# Patient Record
Sex: FEMALE | Race: WHITE | NOT HISPANIC OR LATINO | Employment: OTHER | URBAN - METROPOLITAN AREA
[De-identification: names, ages, dates, MRNs, and addresses within clinical notes are randomized per-mention and may not be internally consistent; named-entity substitution may affect disease eponyms.]

---

## 2018-07-27 ENCOUNTER — TRANSFERRED RECORDS (OUTPATIENT)
Dept: HEALTH INFORMATION MANAGEMENT | Facility: CLINIC | Age: 78
End: 2018-07-27

## 2018-07-27 ENCOUNTER — MEDICAL CORRESPONDENCE (OUTPATIENT)
Dept: CARDIOLOGY | Facility: CLINIC | Age: 78
End: 2018-07-27
Payer: MEDICARE

## 2018-07-27 PROCEDURE — 99205 OFFICE O/P NEW HI 60 MIN: CPT | Mod: ZP | Performed by: INTERNAL MEDICINE

## 2018-08-02 ENCOUNTER — TELEPHONE (OUTPATIENT)
Dept: CARDIOLOGY | Facility: CLINIC | Age: 78
End: 2018-08-02

## 2018-08-02 DIAGNOSIS — I27.20 PULMONARY HYPERTENSION (H): ICD-10-CM

## 2018-08-02 DIAGNOSIS — I50.9 HEART FAILURE (H): ICD-10-CM

## 2018-08-02 DIAGNOSIS — Z11.59 ENCOUNTER FOR SCREENING FOR OTHER VIRAL DISEASES (CODE): ICD-10-CM

## 2018-08-02 DIAGNOSIS — R06.09 DYSPNEA ON EXERTION: ICD-10-CM

## 2018-08-02 DIAGNOSIS — Z53.9 ERRONEOUS ENCOUNTER--DISREGARD: Primary | ICD-10-CM

## 2018-08-02 RX ORDER — LIDOCAINE 40 MG/G
CREAM TOPICAL
Status: CANCELLED | OUTPATIENT
Start: 2018-08-02 | End: 2019-08-02

## 2018-08-02 NOTE — PROGRESS NOTES
Date: 2018    Time of Call: 1:20 PM     Diagnosis:  PH     [ TORB ] Ordering provider: Sergio Hill MD   Order:   Team:    Patty Rivera  : 1940  Phone: [phone]    She needs the full deal:    1. PH blood work  2. HRCT  3. V/Q scan  4. RHC with vasodilator testing  5. Six minute walk test    She is scheduled to see me back in [place] in September. If you need more info on her, you can review my note in [facility].     Thank you.     Sincerely,  Sergio     Order received by: Amira Gonzalez RN     Follow-up/additional notes: Called pt and left voicemail to review testing information.

## 2018-08-09 ENCOUNTER — TELEPHONE (OUTPATIENT)
Dept: CARDIOLOGY | Facility: CLINIC | Age: 78
End: 2018-08-09

## 2018-08-09 DIAGNOSIS — I27.0 PRIMARY PULMONARY HYPERTENSION (H): Primary | ICD-10-CM

## 2018-08-09 PROBLEM — J98.4 RESTRICTIVE LUNG DISEASE: Status: ACTIVE | Noted: 2018-06-06

## 2018-08-09 RX ORDER — LEVOTHYROXINE SODIUM 25 UG/1
50 CAPSULE ORAL DAILY
COMMUNITY
Start: 2012-07-05 | End: 2018-08-09

## 2018-08-09 RX ORDER — FUROSEMIDE 20 MG
20 TABLET ORAL DAILY
COMMUNITY
Start: 2012-07-05 | End: 2020-11-05

## 2018-08-09 RX ORDER — TRAZODONE HYDROCHLORIDE 50 MG/1
50 TABLET, FILM COATED ORAL
Status: ON HOLD | COMMUNITY
End: 2020-08-19

## 2018-08-09 RX ORDER — ATORVASTATIN CALCIUM 10 MG/1
20 TABLET, FILM COATED ORAL DAILY
COMMUNITY
Start: 2012-07-05

## 2018-08-09 RX ORDER — AMLODIPINE BESYLATE 10 MG/1
10 TABLET ORAL DAILY
COMMUNITY
Start: 2018-06-28

## 2018-08-09 RX ORDER — LEVOTHYROXINE SODIUM 25 UG/1
88 CAPSULE ORAL DAILY
Qty: 30 CAPSULE | COMMUNITY
Start: 2018-08-09

## 2018-08-09 RX ORDER — LOSARTAN POTASSIUM 100 MG/1
50 TABLET ORAL DAILY
COMMUNITY
Start: 2012-07-05 | End: 2021-02-08

## 2018-08-09 RX ORDER — GLIMEPIRIDE 1 MG/1
1 TABLET ORAL DAILY
Status: ON HOLD | COMMUNITY
Start: 2013-08-07 | End: 2020-08-19

## 2018-08-09 NOTE — TELEPHONE ENCOUNTER
Patient was transferred to me after having scheduled the following tests;      [facility]    6mwt    VQ scan    Chest CT    Reviewed what each test was for, and any pre-procedure instructions.  Discussed holding her diuretic and diabetic med AM of [facility].  Reviewed and updated allergies. Patient was provided my direct dial number for any future questions.  Patient verbalized understanding, agreed with plan and denied any further questions. Lashawn Yu RN on 8/9/2018 at 11:26 AM

## 2018-08-22 ENCOUNTER — RADIANT APPOINTMENT (OUTPATIENT)
Dept: CT IMAGING | Facility: CLINIC | Age: 78
End: 2018-08-22
Attending: INTERNAL MEDICINE
Payer: MEDICARE

## 2018-08-22 DIAGNOSIS — Z11.59 ENCOUNTER FOR SCREENING FOR OTHER VIRAL DISEASES (CODE): ICD-10-CM

## 2018-08-22 DIAGNOSIS — I50.9 HEART FAILURE (H): ICD-10-CM

## 2018-08-22 DIAGNOSIS — I27.20 PULMONARY HYPERTENSION (H): ICD-10-CM

## 2018-08-22 DIAGNOSIS — R06.09 DYSPNEA ON EXERTION: ICD-10-CM

## 2018-08-22 LAB
6 MIN WALK (FT): 615 FT
6 MIN WALK (M): 187 M
FIO2-PRE: 36 %

## 2018-08-23 ENCOUNTER — APPOINTMENT (OUTPATIENT)
Dept: CARDIOLOGY | Facility: CLINIC | Age: 78
End: 2018-08-23
Attending: INTERNAL MEDICINE
Payer: MEDICARE

## 2018-08-23 ENCOUNTER — APPOINTMENT (OUTPATIENT)
Dept: LAB | Facility: CLINIC | Age: 78
End: 2018-08-23
Attending: INTERNAL MEDICINE
Payer: MEDICARE

## 2018-08-23 ENCOUNTER — HOSPITAL ENCOUNTER (OUTPATIENT)
Dept: NUCLEAR MEDICINE | Facility: CLINIC | Age: 78
Setting detail: NUCLEAR MEDICINE
End: 2018-08-23
Attending: INTERNAL MEDICINE
Payer: MEDICARE

## 2018-08-23 ENCOUNTER — APPOINTMENT (OUTPATIENT)
Dept: MEDSURG UNIT | Facility: CLINIC | Age: 78
End: 2018-08-23
Attending: INTERNAL MEDICINE
Payer: MEDICARE

## 2018-08-23 ENCOUNTER — HOSPITAL ENCOUNTER (OUTPATIENT)
Facility: CLINIC | Age: 78
Discharge: HOME | End: 2018-08-23
Attending: INTERNAL MEDICINE | Admitting: INTERNAL MEDICINE
Payer: MEDICARE

## 2018-08-23 VITALS
SYSTOLIC BLOOD PRESSURE: 153 MMHG | HEART RATE: 78 BPM | OXYGEN SATURATION: 98 % | WEIGHT: 217 LBS | HEIGHT: 61 IN | DIASTOLIC BLOOD PRESSURE: 79 MMHG | BODY MASS INDEX: 40.97 KG/M2 | TEMPERATURE: 97.6 F | RESPIRATION RATE: 22 BRPM

## 2018-08-23 DIAGNOSIS — I27.20 PULMONARY HYPERTENSION (H): ICD-10-CM

## 2018-08-23 DIAGNOSIS — R06.09 DYSPNEA ON EXERTION: ICD-10-CM

## 2018-08-23 DIAGNOSIS — Z11.59 ENCOUNTER FOR SCREENING FOR OTHER VIRAL DISEASES (CODE): ICD-10-CM

## 2018-08-23 DIAGNOSIS — I50.9 HEART FAILURE (H): ICD-10-CM

## 2018-08-23 LAB
ALBUMIN SERPL-MCNC: 4.3 G/DL (ref 3.4–5)
ALP SERPL-CCNC: 99 U/L (ref 40–150)
ALT SERPL W P-5'-P-CCNC: 23 U/L (ref 0–50)
ANION GAP SERPL CALCULATED.3IONS-SCNC: 7 MMOL/L (ref 3–14)
AST SERPL W P-5'-P-CCNC: 25 U/L (ref 0–45)
BILIRUB DIRECT SERPL-MCNC: 0.2 MG/DL (ref 0–0.2)
BILIRUB SERPL-MCNC: 1 MG/DL (ref 0.2–1.3)
BUN SERPL-MCNC: 31 MG/DL (ref 7–30)
CALCIUM SERPL-MCNC: 9.3 MG/DL (ref 8.5–10.1)
CHLORIDE SERPL-SCNC: 106 MMOL/L (ref 94–109)
CHOLEST SERPL-MCNC: 163 MG/DL
CO2 SERPL-SCNC: 27 MMOL/L (ref 20–32)
CREAT SERPL-MCNC: 1.34 MG/DL (ref 0.52–1.04)
CRP SERPL-MCNC: 3.4 MG/L (ref 0–8)
ERYTHROCYTE [DISTWIDTH] IN BLOOD BY AUTOMATED COUNT: 13.3 % (ref 10–15)
GFR SERPL CREATININE-BSD FRML MDRD: 38 ML/MIN/1.7M2
GLUCOSE SERPL-MCNC: 132 MG/DL (ref 70–99)
HBV CORE AB SERPL QL IA: NONREACTIVE
HBV SURFACE AB SERPL IA-ACNC: 0.17 M[IU]/ML
HBV SURFACE AG SERPL QL IA: NONREACTIVE
HCT VFR BLD AUTO: 40.3 % (ref 35–47)
HCV AB SERPL QL IA: NONREACTIVE
HDLC SERPL-MCNC: 69 MG/DL
HGB BLD-MCNC: 13.5 G/DL (ref 11.7–15.7)
HIV 1+2 AB+HIV1 P24 AG SERPL QL IA: NONREACTIVE
INR PPP: 1.15 (ref 0.86–1.14)
IRON SATN MFR SERPL: 28 % (ref 15–46)
IRON SERPL-MCNC: 95 UG/DL (ref 35–180)
LDLC SERPL CALC-MCNC: 63 MG/DL
MCH RBC QN AUTO: 31.2 PG (ref 26.5–33)
MCHC RBC AUTO-ENTMCNC: 33.5 G/DL (ref 31.5–36.5)
MCV RBC AUTO: 93 FL (ref 78–100)
NONHDLC SERPL-MCNC: 94 MG/DL
NT-PROBNP SERPL-MCNC: 356 PG/ML (ref 0–450)
PLATELET # BLD AUTO: 337 10E9/L (ref 150–450)
POTASSIUM SERPL-SCNC: 4.5 MMOL/L (ref 3.4–5.3)
PROT SERPL-MCNC: 8 G/DL (ref 6.8–8.8)
RBC # BLD AUTO: 4.33 10E12/L (ref 3.8–5.2)
RHEUMATOID FACT SER NEPH-ACNC: <20 IU/ML (ref 0–20)
SODIUM SERPL-SCNC: 140 MMOL/L (ref 133–144)
TIBC SERPL-MCNC: 338 UG/DL (ref 240–430)
TRIGL SERPL-MCNC: 153 MG/DL
TSH SERPL DL<=0.005 MIU/L-ACNC: 3.63 MU/L (ref 0.4–4)
WBC # BLD AUTO: 6.7 10E9/L (ref 4–11)

## 2018-08-23 PROCEDURE — 85027 COMPLETE CBC AUTOMATED: CPT | Performed by: INTERNAL MEDICINE

## 2018-08-23 PROCEDURE — 36415 COLL VENOUS BLD VENIPUNCTURE: CPT | Performed by: INTERNAL MEDICINE

## 2018-08-23 PROCEDURE — 85610 PROTHROMBIN TIME: CPT | Performed by: INTERNAL MEDICINE

## 2018-08-23 PROCEDURE — 93451 RIGHT HEART CATH: CPT | Mod: 26 | Performed by: INTERNAL MEDICINE

## 2018-08-23 PROCEDURE — 40000166 ZZH STATISTIC PP CARE STAGE 1

## 2018-08-23 PROCEDURE — 84238 ASSAY NONENDOCRINE RECEPTOR: CPT | Performed by: INTERNAL MEDICINE

## 2018-08-23 PROCEDURE — 80048 BASIC METABOLIC PNL TOTAL CA: CPT | Performed by: INTERNAL MEDICINE

## 2018-08-23 PROCEDURE — 00000401 ZZHCL STATISTIC THROMBIN TIME NC: Performed by: INTERNAL MEDICINE

## 2018-08-23 PROCEDURE — 86140 C-REACTIVE PROTEIN: CPT | Performed by: INTERNAL MEDICINE

## 2018-08-23 PROCEDURE — 85613 RUSSELL VIPER VENOM DILUTED: CPT | Performed by: INTERNAL MEDICINE

## 2018-08-23 PROCEDURE — A9567 TECHNETIUM TC-99M AEROSOL: HCPCS | Performed by: INTERNAL MEDICINE

## 2018-08-23 PROCEDURE — 93463 DRUG ADMIN & HEMODYNMIC MEAS: CPT | Performed by: INTERNAL MEDICINE

## 2018-08-23 PROCEDURE — 86706 HEP B SURFACE ANTIBODY: CPT | Performed by: INTERNAL MEDICINE

## 2018-08-23 PROCEDURE — 86431 RHEUMATOID FACTOR QUANT: CPT | Performed by: INTERNAL MEDICINE

## 2018-08-23 PROCEDURE — G0499 HEPB SCREEN HIGH RISK INDIV: HCPCS | Performed by: INTERNAL MEDICINE

## 2018-08-23 PROCEDURE — 84443 ASSAY THYROID STIM HORMONE: CPT | Performed by: INTERNAL MEDICINE

## 2018-08-23 PROCEDURE — 86038 ANTINUCLEAR ANTIBODIES: CPT | Performed by: INTERNAL MEDICINE

## 2018-08-23 PROCEDURE — 86803 HEPATITIS C AB TEST: CPT | Performed by: INTERNAL MEDICINE

## 2018-08-23 PROCEDURE — 27211181 ZZH BALLOON TIP PRESSURE CR5

## 2018-08-23 PROCEDURE — 83880 ASSAY OF NATRIURETIC PEPTIDE: CPT | Performed by: INTERNAL MEDICINE

## 2018-08-23 PROCEDURE — 83540 ASSAY OF IRON: CPT | Performed by: INTERNAL MEDICINE

## 2018-08-23 PROCEDURE — 34300033 ZZH RX 343: Performed by: INTERNAL MEDICINE

## 2018-08-23 PROCEDURE — 85730 THROMBOPLASTIN TIME PARTIAL: CPT | Performed by: INTERNAL MEDICINE

## 2018-08-23 PROCEDURE — 27210982 ZZH KIT RT HC TOTES DISP CR7

## 2018-08-23 PROCEDURE — 27210210 NM LUNG SCAN VENTILATION AND PERFUSION

## 2018-08-23 PROCEDURE — 87389 HIV-1 AG W/HIV-1&-2 AB AG IA: CPT | Performed by: INTERNAL MEDICINE

## 2018-08-23 PROCEDURE — 86704 HEP B CORE ANTIBODY TOTAL: CPT | Performed by: INTERNAL MEDICINE

## 2018-08-23 PROCEDURE — 80076 HEPATIC FUNCTION PANEL: CPT | Performed by: INTERNAL MEDICINE

## 2018-08-23 PROCEDURE — 83520 IMMUNOASSAY QUANT NOS NONAB: CPT | Performed by: INTERNAL MEDICINE

## 2018-08-23 PROCEDURE — 27210787 ZZH MANIFOLD CR2

## 2018-08-23 PROCEDURE — 80061 LIPID PANEL: CPT | Performed by: INTERNAL MEDICINE

## 2018-08-23 PROCEDURE — 93463 DRUG ADMIN & HEMODYNMIC MEAS: CPT

## 2018-08-23 PROCEDURE — A9540 TC99M MAA: HCPCS | Performed by: INTERNAL MEDICINE

## 2018-08-23 PROCEDURE — 25000125 ZZHC RX 250: Performed by: INTERNAL MEDICINE

## 2018-08-23 PROCEDURE — 83550 IRON BINDING TEST: CPT | Performed by: INTERNAL MEDICINE

## 2018-08-23 PROCEDURE — 93451 RIGHT HEART CATH: CPT

## 2018-08-23 RX ORDER — ARGATROBAN 1 MG/ML
150 INJECTION, SOLUTION INTRAVENOUS
Status: DISCONTINUED | OUTPATIENT
Start: 2018-08-23 | End: 2018-08-23 | Stop reason: HOSPADM

## 2018-08-23 RX ORDER — BETAMETHASONE DIPROPIONATE 0.05 %
OINTMENT (GRAM) TOPICAL 2 TIMES DAILY
COMMUNITY

## 2018-08-23 RX ORDER — DOBUTAMINE HYDROCHLORIDE 200 MG/100ML
2-20 INJECTION INTRAVENOUS CONTINUOUS PRN
Status: DISCONTINUED | OUTPATIENT
Start: 2018-08-23 | End: 2018-08-23 | Stop reason: HOSPADM

## 2018-08-23 RX ORDER — METOPROLOL TARTRATE 1 MG/ML
5 INJECTION, SOLUTION INTRAVENOUS EVERY 5 MIN PRN
Status: DISCONTINUED | OUTPATIENT
Start: 2018-08-23 | End: 2018-08-23 | Stop reason: HOSPADM

## 2018-08-23 RX ORDER — PHENYLEPHRINE HCL IN 0.9% NACL 1 MG/10 ML
20-100 SYRINGE (ML) INTRAVENOUS
Status: DISCONTINUED | OUTPATIENT
Start: 2018-08-23 | End: 2018-08-23 | Stop reason: HOSPADM

## 2018-08-23 RX ORDER — PRASUGREL 10 MG/1
10-60 TABLET, FILM COATED ORAL
Status: DISCONTINUED | OUTPATIENT
Start: 2018-08-23 | End: 2018-08-23 | Stop reason: HOSPADM

## 2018-08-23 RX ORDER — POTASSIUM CHLORIDE 7.45 MG/ML
10 INJECTION INTRAVENOUS
Status: DISCONTINUED | OUTPATIENT
Start: 2018-08-23 | End: 2018-08-23 | Stop reason: HOSPADM

## 2018-08-23 RX ORDER — FLUMAZENIL 0.1 MG/ML
0.2 INJECTION, SOLUTION INTRAVENOUS
Status: DISCONTINUED | OUTPATIENT
Start: 2018-08-23 | End: 2018-08-23 | Stop reason: HOSPADM

## 2018-08-23 RX ORDER — NITROGLYCERIN 20 MG/100ML
.07-2 INJECTION INTRAVENOUS CONTINUOUS PRN
Status: DISCONTINUED | OUTPATIENT
Start: 2018-08-23 | End: 2018-08-23 | Stop reason: HOSPADM

## 2018-08-23 RX ORDER — NALOXONE HYDROCHLORIDE 0.4 MG/ML
0.4 INJECTION, SOLUTION INTRAMUSCULAR; INTRAVENOUS; SUBCUTANEOUS EVERY 5 MIN PRN
Status: DISCONTINUED | OUTPATIENT
Start: 2018-08-23 | End: 2018-08-23 | Stop reason: HOSPADM

## 2018-08-23 RX ORDER — LIDOCAINE 40 MG/G
CREAM TOPICAL
Status: COMPLETED | OUTPATIENT
Start: 2018-08-23 | End: 2018-08-23

## 2018-08-23 RX ORDER — HEPARIN SODIUM 1000 [USP'U]/ML
1000-10000 INJECTION, SOLUTION INTRAVENOUS; SUBCUTANEOUS EVERY 5 MIN PRN
Status: DISCONTINUED | OUTPATIENT
Start: 2018-08-23 | End: 2018-08-23 | Stop reason: HOSPADM

## 2018-08-23 RX ORDER — EPINEPHRINE 1 MG/ML
0.3 INJECTION, SOLUTION, CONCENTRATE INTRAVENOUS
Status: DISCONTINUED | OUTPATIENT
Start: 2018-08-23 | End: 2018-08-23 | Stop reason: HOSPADM

## 2018-08-23 RX ORDER — ASPIRIN 325 MG
325 TABLET ORAL
Status: DISCONTINUED | OUTPATIENT
Start: 2018-08-23 | End: 2018-08-23 | Stop reason: HOSPADM

## 2018-08-23 RX ORDER — DEXTROSE MONOHYDRATE 25 G/50ML
12.5-5 INJECTION, SOLUTION INTRAVENOUS EVERY 30 MIN PRN
Status: DISCONTINUED | OUTPATIENT
Start: 2018-08-23 | End: 2018-08-23 | Stop reason: HOSPADM

## 2018-08-23 RX ORDER — LIDOCAINE HYDROCHLORIDE 10 MG/ML
30 INJECTION, SOLUTION EPIDURAL; INFILTRATION; INTRACAUDAL; PERINEURAL
Status: DISCONTINUED | OUTPATIENT
Start: 2018-08-23 | End: 2018-08-23 | Stop reason: HOSPADM

## 2018-08-23 RX ORDER — FUROSEMIDE 10 MG/ML
20-100 INJECTION INTRAMUSCULAR; INTRAVENOUS
Status: DISCONTINUED | OUTPATIENT
Start: 2018-08-23 | End: 2018-08-23 | Stop reason: HOSPADM

## 2018-08-23 RX ORDER — LORAZEPAM 2 MG/ML
.5-2 INJECTION INTRAMUSCULAR EVERY 4 HOURS PRN
Status: DISCONTINUED | OUTPATIENT
Start: 2018-08-23 | End: 2018-08-23 | Stop reason: HOSPADM

## 2018-08-23 RX ORDER — SODIUM NITROPRUSSIDE 25 MG/ML
100-200 INJECTION INTRAVENOUS
Status: DISCONTINUED | OUTPATIENT
Start: 2018-08-23 | End: 2018-08-23 | Stop reason: HOSPADM

## 2018-08-23 RX ORDER — DOPAMINE HYDROCHLORIDE 160 MG/100ML
2-20 INJECTION, SOLUTION INTRAVENOUS CONTINUOUS PRN
Status: DISCONTINUED | OUTPATIENT
Start: 2018-08-23 | End: 2018-08-23 | Stop reason: HOSPADM

## 2018-08-23 RX ORDER — VERAPAMIL HYDROCHLORIDE 2.5 MG/ML
1-5 INJECTION, SOLUTION INTRAVENOUS
Status: DISCONTINUED | OUTPATIENT
Start: 2018-08-23 | End: 2018-08-23 | Stop reason: HOSPADM

## 2018-08-23 RX ORDER — EPTIFIBATIDE 2 MG/ML
180 INJECTION, SOLUTION INTRAVENOUS EVERY 10 MIN PRN
Status: DISCONTINUED | OUTPATIENT
Start: 2018-08-23 | End: 2018-08-23 | Stop reason: HOSPADM

## 2018-08-23 RX ORDER — PROTAMINE SULFATE 10 MG/ML
25-100 INJECTION, SOLUTION INTRAVENOUS EVERY 5 MIN PRN
Status: DISCONTINUED | OUTPATIENT
Start: 2018-08-23 | End: 2018-08-23 | Stop reason: HOSPADM

## 2018-08-23 RX ORDER — MAGNESIUM HYDROXIDE 1200 MG/15ML
1000 LIQUID ORAL CONTINUOUS PRN
Status: DISCONTINUED | OUTPATIENT
Start: 2018-08-23 | End: 2018-08-23 | Stop reason: HOSPADM

## 2018-08-23 RX ORDER — ARGATROBAN 1 MG/ML
350 INJECTION, SOLUTION INTRAVENOUS
Status: DISCONTINUED | OUTPATIENT
Start: 2018-08-23 | End: 2018-08-23 | Stop reason: HOSPADM

## 2018-08-23 RX ORDER — EPTIFIBATIDE 2 MG/ML
2 INJECTION, SOLUTION INTRAVENOUS CONTINUOUS PRN
Status: DISCONTINUED | OUTPATIENT
Start: 2018-08-23 | End: 2018-08-23 | Stop reason: HOSPADM

## 2018-08-23 RX ORDER — NITROGLYCERIN 0.4 MG/1
0.4 TABLET SUBLINGUAL EVERY 5 MIN PRN
Status: DISCONTINUED | OUTPATIENT
Start: 2018-08-23 | End: 2018-08-23 | Stop reason: HOSPADM

## 2018-08-23 RX ORDER — ENALAPRILAT 1.25 MG/ML
1.25-2.5 INJECTION INTRAVENOUS
Status: DISCONTINUED | OUTPATIENT
Start: 2018-08-23 | End: 2018-08-23 | Stop reason: HOSPADM

## 2018-08-23 RX ORDER — NITROGLYCERIN 5 MG/ML
100-200 VIAL (ML) INTRAVENOUS
Status: DISCONTINUED | OUTPATIENT
Start: 2018-08-23 | End: 2018-08-23 | Stop reason: HOSPADM

## 2018-08-23 RX ORDER — ONDANSETRON 2 MG/ML
4 INJECTION INTRAMUSCULAR; INTRAVENOUS EVERY 4 HOURS PRN
Status: DISCONTINUED | OUTPATIENT
Start: 2018-08-23 | End: 2018-08-23 | Stop reason: HOSPADM

## 2018-08-23 RX ORDER — ASPIRIN 81 MG/1
81-324 TABLET, CHEWABLE ORAL
Status: DISCONTINUED | OUTPATIENT
Start: 2018-08-23 | End: 2018-08-23 | Stop reason: HOSPADM

## 2018-08-23 RX ORDER — METHYLPREDNISOLONE SODIUM SUCCINATE 125 MG/2ML
125 INJECTION, POWDER, LYOPHILIZED, FOR SOLUTION INTRAMUSCULAR; INTRAVENOUS
Status: DISCONTINUED | OUTPATIENT
Start: 2018-08-23 | End: 2018-08-23 | Stop reason: HOSPADM

## 2018-08-23 RX ORDER — ADENOSINE 3 MG/ML
12-12000 INJECTION, SOLUTION INTRAVENOUS
Status: DISCONTINUED | OUTPATIENT
Start: 2018-08-23 | End: 2018-08-23 | Stop reason: HOSPADM

## 2018-08-23 RX ORDER — NITROGLYCERIN 5 MG/ML
100-500 VIAL (ML) INTRAVENOUS
Status: DISCONTINUED | OUTPATIENT
Start: 2018-08-23 | End: 2018-08-23 | Stop reason: HOSPADM

## 2018-08-23 RX ORDER — DIPHENHYDRAMINE HYDROCHLORIDE 50 MG/ML
25-50 INJECTION INTRAMUSCULAR; INTRAVENOUS
Status: DISCONTINUED | OUTPATIENT
Start: 2018-08-23 | End: 2018-08-23 | Stop reason: HOSPADM

## 2018-08-23 RX ORDER — CLOPIDOGREL BISULFATE 75 MG/1
75 TABLET ORAL
Status: DISCONTINUED | OUTPATIENT
Start: 2018-08-23 | End: 2018-08-23 | Stop reason: HOSPADM

## 2018-08-23 RX ORDER — CLOPIDOGREL BISULFATE 75 MG/1
300-600 TABLET ORAL
Status: DISCONTINUED | OUTPATIENT
Start: 2018-08-23 | End: 2018-08-23 | Stop reason: HOSPADM

## 2018-08-23 RX ORDER — NIFEDIPINE 10 MG/1
10 CAPSULE ORAL
Status: DISCONTINUED | OUTPATIENT
Start: 2018-08-23 | End: 2018-08-23 | Stop reason: HOSPADM

## 2018-08-23 RX ORDER — HYDRALAZINE HYDROCHLORIDE 20 MG/ML
10-20 INJECTION INTRAMUSCULAR; INTRAVENOUS
Status: DISCONTINUED | OUTPATIENT
Start: 2018-08-23 | End: 2018-08-23 | Stop reason: HOSPADM

## 2018-08-23 RX ORDER — POTASSIUM CHLORIDE 29.8 MG/ML
20 INJECTION INTRAVENOUS
Status: DISCONTINUED | OUTPATIENT
Start: 2018-08-23 | End: 2018-08-23 | Stop reason: HOSPADM

## 2018-08-23 RX ORDER — ATROPINE SULFATE 0.1 MG/ML
.5-1 INJECTION INTRAVENOUS
Status: DISCONTINUED | OUTPATIENT
Start: 2018-08-23 | End: 2018-08-23 | Stop reason: HOSPADM

## 2018-08-23 RX ORDER — PROTAMINE SULFATE 10 MG/ML
1-5 INJECTION, SOLUTION INTRAVENOUS
Status: DISCONTINUED | OUTPATIENT
Start: 2018-08-23 | End: 2018-08-23 | Stop reason: HOSPADM

## 2018-08-23 RX ORDER — NICARDIPINE HYDROCHLORIDE 2.5 MG/ML
100 INJECTION INTRAVENOUS
Status: DISCONTINUED | OUTPATIENT
Start: 2018-08-23 | End: 2018-08-23 | Stop reason: HOSPADM

## 2018-08-23 RX ORDER — EPTIFIBATIDE 2 MG/ML
1 INJECTION, SOLUTION INTRAVENOUS CONTINUOUS PRN
Status: DISCONTINUED | OUTPATIENT
Start: 2018-08-23 | End: 2018-08-23 | Stop reason: HOSPADM

## 2018-08-23 RX ADMIN — LIDOCAINE: 40 CREAM TOPICAL at 10:03

## 2018-08-23 RX ADMIN — Medication 6 MCI.: at 08:44

## 2018-08-23 RX ADMIN — KIT FOR THE PREPARATION OF TECHNETIUM TC 99M PENTETATE 2 MCI.: 20 INJECTION, POWDER, LYOPHILIZED, FOR SOLUTION INTRAVENOUS; RESPIRATORY (INHALATION) at 08:03

## 2018-08-23 NOTE — IP AVS SNAPSHOT
[address] [address]                                       After Visit Summary   8/23/2018    Patty Rivera    MRN: [id number]           After Visit Summary Signature Page     I have received my discharge instructions, and my questions have been answered. I have discussed any challenges I see with this plan with the nurse or doctor.    ..........................................................................................................................................  Patient/Patient Representative Signature      ..........................................................................................................................................  Patient Representative Print Name and Relationship to Patient    ..................................................               ................................................  Date                                            Time    ..........................................................................................................................................  Reviewed by Signature/Title    ...................................................              ..............................................  Date                                                            Time

## 2018-08-23 NOTE — DISCHARGE INSTRUCTIONS
[facility]                        Interventional Cardiology  Discharge Instructions   Post Right Heart Cath      AFTER YOU GO HOME:    DO drink plenty of fluids    DO resume your regular diet and medications unless otherwise instructed by your Primary Physician    Do Not scrub the procedure site vigorously    No lotion or powder to the puncture site for 3 days    CALL YOUR PRIMARY PHYSICIAN IF: You may resume all normal activity.  Monitor neck site for bleeding, swelling, or voice changes. If you notice bleeding or swelling immediately apply pressure to the site and call number below to speak with Cardiology Fellow.  If you experience any changes in your breathing you should call your doctor immediately or come to the closest Emergency Department.  Do not drive yourself.    ADDITIONAL INSTRUCTIONS: Medications: You are to resume all home medications including anticoagulation therapy unless otherwise advised by your primary cardiologist or nurse coordinator.    Follow Up: Per your primary cardiology team    If you have any questions or concerns regarding your procedure site please call [phone] at anytime and ask for Cardiology Fellow on call.  They are available 24 hours a day.  You may also contact the Cardiology Clinic after hours number at [phone].                                                       Telephone Numbers [phone] Monday-Friday 8:00 am to 4:30 pm    [phone] After 4:30 pm Monday-Friday, Weekends & Holidays  Ask for Interventional Cardiologist on call. Someone is on call 24 hours/day   [facility] toll free number [phone] Monday-Friday 8:00 am to 4:30 pm   [facility] Emergency Dept [phone]

## 2018-08-23 NOTE — PROGRESS NOTES
Pt arrived the the Cardiac Catheterization Lab for a right heart cath.   7 fr sheath removed from the RIJ site. Manual pressure held until hemostasis has been obtained.  Soft, no hematoma.  No bleeding, oozing or discoloration seen.  Band Aid applied.  Pt educated to watch for any signs of bleeding, pain, or voice changes after discharge for the hospital.    Report called.

## 2018-08-23 NOTE — IP AVS SNAPSHOT
MRN:[id number]                      After Visit Summary   8/23/2018    Patty Rivera    MRN: [id number]           Visit Information        Department      8/23/2018  8:53 AM [address] [facility]          Review of your medicines      UNREVIEWED medicines. Ask your doctor about these medicines        Dose / Directions    amLODIPine 10 MG tablet   Commonly known as:  NORVASC        Dose:  10 mg   Take 10 mg by mouth daily   Refills:  0       atorvastatin 10 MG tablet   Commonly known as:  LIPITOR        Dose:  20 mg   Take 20 mg by mouth daily   Refills:  0       furosemide 20 MG tablet   Commonly known as:  LASIX        Dose:  20 mg   Take 20 mg by mouth every other day   Refills:  0       glimepiride 1 MG tablet   Commonly known as:  AMARYL        Dose:  1 mg   Take 1 mg by mouth daily   Refills:  0       Levothyroxine Sodium 25 MCG Caps        Dose:  50 mcg   Take 50 mcg by mouth daily   Quantity:  30 capsule   Refills:  0       losartan 100 MG tablet   Commonly known as:  COZAAR        Dose:  100 mg   Take 100 mg by mouth daily   Refills:  0       traZODone 50 MG tablet   Commonly known as:  DESYREL        Dose:  50 mg   Take 50 mg by mouth nightly as needed   Refills:  0                Protect others around you: Learn how to safely use, store and throw away your medicines at www.disposemymeds.org.         Follow-ups after your visit        Your next 10 appointments already scheduled     Aug 23, 2018 10:30 AM CDT   Heart Cath Right Heart Cath with [id number]   [facility] ([facility], [facility])    [address] [address]   [phone]               Care Instructions        Further instructions from your care team       [facility]                        Interventional Cardiology  Discharge Instructions   Post Right Heart Cath      AFTER YOU GO HOME:    DO drink plenty of fluids    DO resume your regular diet  and medications unless otherwise instructed by your Primary Physician    Do Not scrub the procedure site vigorously    No lotion or powder to the puncture site for 3 days    CALL YOUR PRIMARY PHYSICIAN IF: You may resume all normal activity.  Monitor neck site for bleeding, swelling, or voice changes. If you notice bleeding or swelling immediately apply pressure to the site and call number below to speak with Cardiology Fellow.  If you experience any changes in your breathing you should call your doctor immediately or come to the closest Emergency Department.  Do not drive yourself.    ADDITIONAL INSTRUCTIONS: Medications: You are to resume all home medications including anticoagulation therapy unless otherwise advised by your primary cardiologist or nurse coordinator.    Follow Up: Per your primary cardiology team    If you have any questions or concerns regarding your procedure site please call [phone] at anytime and ask for Cardiology Fellow on call.  They are available 24 hours a day.  You may also contact the Cardiology Clinic after hours number at [phone].                                                       Telephone Numbers [phone] Monday-Friday 8:00 am to 4:30 pm    [phone] After 4:30 pm Monday-Friday, Weekends & Holidays  Ask for Interventional Cardiologist on call. Someone is on call 24 hours/day   [facility] toll free number [phone] Monday-Friday 8:00 am to 4:30 pm   [facility] Emergency Dept [phone]                    Additional Information About Your Visit        [employer] Information     [employer] gives you secure access to your electronic health record. If you see a primary care provider, you can also send messages to your care team and make appointments. If you have questions, please call your primary care clinic.  If you do not have a primary care provider, please call [phone] and they will assist you.        Care EveryWhere ID     This is your Care EveryWhere  ID. This could be used by other organizations to access your [facility] medical records  [id number]        Your Vitals Were     Blood Pressure Pulse Temperature Respirations Pulse Oximetry       171/75 (BP Location: Right arm) 78 97.6  F (36.4  C) (Oral) 24 94%        Primary Care Provider Fax #    Physician No Ref-Primary [phone]      Equal Access to Services     [facility]: Hadii aad ku hadasho Soomaali, waaxda luqadaha, qaybta kaalmada adeegyada, waxay clarain haymirthan adeangel benson lawangshen . So [facility] [phone].    ATENCIÓN: Si habla español, tiene a randhawa disposición servicios gratuitos de asistencia lingüística. Anjana al [phone].    We comply with applicable federal civil rights laws and [place] laws. We do not discriminate on the basis of race, color, national origin, age, disability, sex, sexual orientation, or gender identity.            Thank you!     Thank you for choosing [facility] for your care. Our goal is always to provide you with excellent care. Hearing back from our patients is one way we can continue to improve our services. Please take a few minutes to complete the written survey that you may receive in the mail after you visit with us. Thank you!             Medication List: This is a list of all your medications and when to take them. Check marks below indicate your daily home schedule. Keep this list as a reference.      Medications           Morning Afternoon Evening Bedtime As Needed    amLODIPine 10 MG tablet   Commonly known as:  NORVASC   Take 10 mg by mouth daily                                atorvastatin 10 MG tablet   Commonly known as:  LIPITOR   Take 20 mg by mouth daily                                furosemide 20 MG tablet   Commonly known as:  LASIX   Take 20 mg by mouth every other day                                glimepiride 1 MG tablet   Commonly known as:  AMARYL   Take 1 mg by mouth daily                                Levothyroxine Sodium 25 MCG Caps   Take 50 mcg  by mouth daily                                losartan 100 MG tablet   Commonly known as:  COZAAR   Take 100 mg by mouth daily                                traZODone 50 MG tablet   Commonly known as:  DESYREL   Take 50 mg by mouth nightly as needed

## 2018-08-23 NOTE — PROGRESS NOTES
Pt arrived to floor with RN s/p RHC. R neck dressing CDI, no hematoma. VSS. DC instructions reviewed with patient and her son Claudy.

## 2018-08-23 NOTE — PROGRESS NOTES
D: Pt arrived in cath lab for Right Heart Catheterization  I: Right heart catheterization completed per MD.  7fr sheath removed from RIJ site, manual pressure applied until hemostasis achieved.  A: RIJ site clean, dry and intact. Soft, no hematoma.  P: Pt to transfer to  for discharge.      Pt educated on watching neck site for bleeding, hematoma, pressure on airway and voice changes.      Report called.

## 2018-08-23 NOTE — PROGRESS NOTES
Prepped for RH procedure with/without vasodilator study.  Son waiting in [facility] waiting room.  Denies pain.  Consent being obtained now.  Labs pending.  H & P ( from [facility]) current.

## 2018-08-23 NOTE — PROCEDURES
PRELIMINARY CARDIAC CATH REPORT:     PROCEDURES PERFORMED:   Right Heart Catheterization    PHYSICIANS:  Attending Physician: Ej Cool MD  Interventional Cardiology Fellow: Percy Dumont M.D.  Cardiology Fellow: Warren Agudelo M.D.    INDICATION:  Patty Rivera is a 78 year old female w/ ILD, HFpEF w/ TOLEDO, hypoxia on exertion here for evaluation for pulmonary hypertension.  The indication for the procedure was ILD, HFpEF, BIRD, hypoxia on exertion      DESCRIPTION:  1. Consent obtained with discussion of risks.  All questions were answered.  2. Sterile prep and procedure.  3. Location with Sheaths:   Rt IJ  7 Fr 10 cm [short]  4. Access: Local anesthetic with lidocaine.  A standard 18 guage needle with ultrasound guidance was used to establish vascular access using a modified Seldinger technique.  5. Diagnostic Catheters:   6 Fr  [place]    6. Estimated blood loss: < 5 ml    MEDICATIONS:    Heart rate, BP, respiration, oxygen saturation and patient responses were monitored throughout the procedure with the assistance of the RN under my supervision.      Procedures:    HEMODYNAMICS:  BSA 2.06 m^2  1. HR 61 bpm  2. /87/125 mmHg  3. RA 5/8/5   4. RV 56/5  5. PA 56/20/34   6. PCW 13/13/10   7. PA sat 67.8%   8. PCW sat 92/8%  9. Hgb 13.0 g/dL   10. Arely CO 5.5   11. Arely CI 2.8   12. TD CO 5.3   13. TD CI 2.7   14. PVR 4.4    With 80 of inhaled NO:  PA 51/16/30  PCW 9  PA sat 80%  SVC sat 84%  Arely CO 7.5  Arely CI 3.8  TD CO 4.6  TD CI 2.4  PVR 4.5      Sheath Removal:  The catheter sheath was manually removed in the cardiac catheterization laboratory.    Contrast: Isovue, 0 ml     Fluoroscopy Time: 4.6 min    COMPLICATIONS:  1. None    SUMMARY:   >> Normal right sided filling pressures.  >> Normal left sided filling pressures.  >> Mild pulmonary artery hypertension  >> Normal cardiac output, 5.5 L/min with index 2.8 L/min/m2   >> Mild improvement in PA pressures with inhaled NO (meanPA 34 to 30) and  no change in PVR (4.4 to 4.5)    PLAN:   >> Patient will follow-up with Dr. Hill in clinic  >> Bedrest per protocol.  >> Continued medical management and lifestyle modification for cardiovascular risk factor optimization.   >>. Discharge today per protocol    The attending interventional cardiologist was present and supervised all critical aspects the procedure.    Findings discussed with Dr. Hill    See CVIS report for final draft.    Warren Agudelo M.D.  Cardiology Fellow    Ej Cool M.D.  Cardiology Staff

## 2018-08-24 LAB
LA PPP-IMP: NEGATIVE
STFR SERPL-SCNC: 2.3 MG/L (ref 1.9–4.4)

## 2018-08-27 LAB — ANA SER QL IF: NEGATIVE

## 2018-08-28 LAB — IL6 SERPL-MCNC: 6.04 PG/ML

## 2018-09-06 ENCOUNTER — TRANSFERRED RECORDS (OUTPATIENT)
Dept: HEALTH INFORMATION MANAGEMENT | Facility: CLINIC | Age: 78
End: 2018-09-06

## 2018-09-06 ENCOUNTER — MEDICAL CORRESPONDENCE (OUTPATIENT)
Dept: CARDIOLOGY | Facility: CLINIC | Age: 78
End: 2018-09-06
Payer: MEDICARE

## 2018-09-06 PROCEDURE — 99214 OFFICE O/P EST MOD 30 MIN: CPT | Mod: ZP | Performed by: INTERNAL MEDICINE

## 2018-10-17 DIAGNOSIS — I27.20 PULMONARY HYPERTENSION (H): Primary | ICD-10-CM

## 2018-10-30 ENCOUNTER — TRANSFERRED RECORDS (OUTPATIENT)
Dept: HEALTH INFORMATION MANAGEMENT | Facility: CLINIC | Age: 78
End: 2018-10-30

## 2018-12-26 ENCOUNTER — TRANSFERRED RECORDS (OUTPATIENT)
Dept: HEALTH INFORMATION MANAGEMENT | Facility: CLINIC | Age: 78
End: 2018-12-26

## 2019-01-16 ENCOUNTER — TRANSFERRED RECORDS (OUTPATIENT)
Dept: HEALTH INFORMATION MANAGEMENT | Facility: CLINIC | Age: 79
End: 2019-01-16

## 2019-05-14 ENCOUNTER — MEDICAL CORRESPONDENCE (OUTPATIENT)
Dept: CARDIOLOGY | Facility: CLINIC | Age: 79
End: 2019-05-14

## 2019-09-19 ENCOUNTER — MEDICAL CORRESPONDENCE (OUTPATIENT)
Dept: CARDIOLOGY | Facility: CLINIC | Age: 79
End: 2019-09-19
Payer: MEDICARE

## 2019-09-19 PROCEDURE — 99214 OFFICE O/P EST MOD 30 MIN: CPT | Mod: ZP | Performed by: INTERNAL MEDICINE

## 2020-01-28 ENCOUNTER — TELEPHONE (OUTPATIENT)
Dept: CARDIOLOGY | Facility: CLINIC | Age: 80
End: 2020-01-28

## 2020-01-28 DIAGNOSIS — I27.20 PULMONARY HYPERTENSION (H): Primary | ICD-10-CM

## 2020-01-28 DIAGNOSIS — R06.09 DYSPNEA ON EXERTION: ICD-10-CM

## 2020-01-28 NOTE — TELEPHONE ENCOUNTER
"Faxed this AM to number provided. Latisha Quinn RN on 1/29/2020 at 9:24 AM  _____________________________________________    Patient called requesting additional physical therapy because she feels like she is \"regressing,\" especially now in the colder weather. Asked that the order be faxed to [phone] when signed. Latisha Quinn RN on 1/28/2020 at 12:53 PM    "

## 2020-02-14 ENCOUNTER — TRANSFERRED RECORDS (OUTPATIENT)
Dept: HEALTH INFORMATION MANAGEMENT | Facility: CLINIC | Age: 80
End: 2020-02-14

## 2020-03-11 ENCOUNTER — HEALTH MAINTENANCE LETTER (OUTPATIENT)
Age: 80
End: 2020-03-11

## 2020-04-28 ENCOUNTER — TRANSFERRED RECORDS (OUTPATIENT)
Dept: HEALTH INFORMATION MANAGEMENT | Facility: CLINIC | Age: 80
End: 2020-04-28

## 2020-06-04 ENCOUNTER — MEDICAL CORRESPONDENCE (OUTPATIENT)
Dept: CARDIOLOGY | Facility: CLINIC | Age: 80
End: 2020-06-04

## 2020-06-04 PROCEDURE — 99442 ZZC PHYSICIAN TELEPHONE EVALUATION 11-20 MIN: CPT | Mod: 95 | Performed by: INTERNAL MEDICINE

## 2020-06-05 DIAGNOSIS — I27.20 PULMONARY HYPERTENSION (H): Primary | ICD-10-CM

## 2020-06-05 DIAGNOSIS — R06.09 DYSPNEA ON EXERTION: ICD-10-CM

## 2020-06-05 NOTE — PROGRESS NOTES
Called and spoke with patient regarding physical therapy. Advised I would fax over the results to [facility] per patient's request and asked patient to call the center if she has not heard from them in a week. Patient verbalized understanding and did not have any further questions. Latisha Quinn RN on 6/5/2020 at 9:55 AM    Physical therapy orders sent to (F) [phone]. Latisha Quinn RN on 6/5/2020 at 9:59 AM

## 2020-06-24 ENCOUNTER — TRANSFERRED RECORDS (OUTPATIENT)
Dept: HEALTH INFORMATION MANAGEMENT | Facility: CLINIC | Age: 80
End: 2020-06-24

## 2020-07-02 ENCOUNTER — MEDICAL CORRESPONDENCE (OUTPATIENT)
Dept: CARDIOLOGY | Facility: CLINIC | Age: 80
End: 2020-07-02

## 2020-07-02 PROCEDURE — 99214 OFFICE O/P EST MOD 30 MIN: CPT | Mod: ZP | Performed by: INTERNAL MEDICINE

## 2020-07-09 ENCOUNTER — TRANSFERRED RECORDS (OUTPATIENT)
Dept: HEALTH INFORMATION MANAGEMENT | Facility: CLINIC | Age: 80
End: 2020-07-09

## 2020-07-23 DIAGNOSIS — Z11.59 ENCOUNTER FOR SCREENING FOR OTHER VIRAL DISEASES: Primary | ICD-10-CM

## 2020-07-23 DIAGNOSIS — I27.20 PULMONARY HYPERTENSION (H): Primary | ICD-10-CM

## 2020-07-23 DIAGNOSIS — R06.09 DYSPNEA ON EXERTION: ICD-10-CM

## 2020-07-23 RX ORDER — LIDOCAINE 40 MG/G
CREAM TOPICAL
Status: CANCELLED | OUTPATIENT
Start: 2020-07-23

## 2020-07-23 NOTE — PROGRESS NOTES
"Per Dr. Hill, \"Her echo is concerning. Her RV is now more enlarged and dysfunctional. Her PA pressures on the echo are high.    Will schedule her for the following at the [facility]:    1. RHC  2. V/Q scan  3. Basic labs\"    Orders placed and staff message sent to Hilary to schedule patient for August 19th. Latisha Quinn RN on 7/23/2020 at 10:32 AM    "

## 2020-07-27 ENCOUNTER — TELEPHONE (OUTPATIENT)
Dept: CARDIOLOGY | Facility: CLINIC | Age: 80
End: 2020-07-27

## 2020-07-27 NOTE — TELEPHONE ENCOUNTER
Health Call Center    Phone Message    May a detailed message be left on voicemail: yes     Reason for Call: Tamy called from [facility] in [place] and wanted to confirm if patient needs to keep appointments on 8/19/2020 with Dr. Hill. Per Tamy, patient is also scheduled there on 8/6 and not sure if for the same thing? Please call to confirm at [phone]. When you call that number, it goes to the Call Center and then you just need to ask for Tamy and they will transfer you.     Action Taken: Message routed to:  [facility] Cardiology    Travel Screening: Not Applicable

## 2020-07-27 NOTE — TELEPHONE ENCOUNTER
I called and spoke with Delicia and she stated that the pt does need to keep both appts.  The appointments on the 19th are a Right heart catheterization and VQ study.  Amira Gonzalez RN on 7/27/2020 at 2:55 PM

## 2020-07-28 ENCOUNTER — MYC MEDICAL ADVICE (OUTPATIENT)
Dept: CARDIOLOGY | Facility: CLINIC | Age: 80
End: 2020-07-28

## 2020-08-06 ENCOUNTER — MEDICAL CORRESPONDENCE (OUTPATIENT)
Dept: CARDIOLOGY | Facility: CLINIC | Age: 80
End: 2020-08-06

## 2020-08-06 PROCEDURE — 99214 OFFICE O/P EST MOD 30 MIN: CPT | Mod: ZP | Performed by: INTERNAL MEDICINE

## 2020-08-12 ENCOUNTER — TELEPHONE (OUTPATIENT)
Dept: CARDIOLOGY | Facility: CLINIC | Age: 80
End: 2020-08-12

## 2020-08-12 NOTE — TELEPHONE ENCOUNTER
Patient called and asked for scheduling information regarding COVID testing prior to her RHC next week. Provided COVID scheduling line. Latisha Quinn RN on 8/12/2020 at 12:36 PM    Patient contacted [facility] and would like orders faxed locally. Faxed orders to (f) [phone] and called patient to make appt no sooner than 4 days prior to appt. Patient verbalized understanding and did not have any further questions. Latisha Quinn RN on 8/12/2020 at 12:40 PM

## 2020-08-13 ENCOUNTER — TELEPHONE (OUTPATIENT)
Dept: CARDIOLOGY | Facility: CLINIC | Age: 80
End: 2020-08-13

## 2020-08-13 NOTE — TELEPHONE ENCOUNTER
Call complete for pre procedure reminder, travel screen and updated visitor policy.  COVID tested today at [facility]

## 2020-08-17 NOTE — TELEPHONE ENCOUNTER
Called and reviewed pre-procedure instructions with patient. Patient verbalized understanding and did not have any further questions. Latisha Quinn RN on 8/17/2020 at 2:37 PM

## 2020-08-19 ENCOUNTER — APPOINTMENT (OUTPATIENT)
Dept: LAB | Facility: CLINIC | Age: 80
End: 2020-08-19
Attending: INTERNAL MEDICINE
Payer: MEDICARE

## 2020-08-19 ENCOUNTER — HOSPITAL ENCOUNTER (OUTPATIENT)
Dept: NUCLEAR MEDICINE | Facility: CLINIC | Age: 80
Setting detail: NUCLEAR MEDICINE
Discharge: HOME | End: 2020-08-19
Attending: INTERNAL MEDICINE | Admitting: INTERNAL MEDICINE
Payer: MEDICARE

## 2020-08-19 ENCOUNTER — APPOINTMENT (OUTPATIENT)
Dept: MEDSURG UNIT | Facility: CLINIC | Age: 80
End: 2020-08-19
Attending: INTERNAL MEDICINE
Payer: MEDICARE

## 2020-08-19 ENCOUNTER — HOSPITAL ENCOUNTER (OUTPATIENT)
Facility: CLINIC | Age: 80
Discharge: HOME | End: 2020-08-19
Attending: INTERNAL MEDICINE | Admitting: INTERNAL MEDICINE
Payer: MEDICARE

## 2020-08-19 VITALS
DIASTOLIC BLOOD PRESSURE: 77 MMHG | BODY MASS INDEX: 36.12 KG/M2 | OXYGEN SATURATION: 96 % | RESPIRATION RATE: 22 BRPM | SYSTOLIC BLOOD PRESSURE: 158 MMHG | WEIGHT: 184 LBS | HEIGHT: 60 IN | TEMPERATURE: 97.9 F | HEART RATE: 82 BPM

## 2020-08-19 DIAGNOSIS — I27.20 PULMONARY HYPERTENSION (H): ICD-10-CM

## 2020-08-19 DIAGNOSIS — R06.09 DYSPNEA ON EXERTION: ICD-10-CM

## 2020-08-19 LAB
ANION GAP SERPL CALCULATED.3IONS-SCNC: 5 MMOL/L (ref 3–14)
BUN SERPL-MCNC: 55 MG/DL (ref 7–30)
CALCIUM SERPL-MCNC: 10 MG/DL (ref 8.5–10.1)
CHLORIDE SERPL-SCNC: 103 MMOL/L (ref 94–109)
CO2 SERPL-SCNC: 28 MMOL/L (ref 20–32)
CREAT SERPL-MCNC: 1.24 MG/DL (ref 0.52–1.04)
ERYTHROCYTE [DISTWIDTH] IN BLOOD BY AUTOMATED COUNT: 13.7 % (ref 10–15)
GFR SERPL CREATININE-BSD FRML MDRD: 42 ML/MIN/{1.73_M2}
GLUCOSE SERPL-MCNC: 204 MG/DL (ref 70–99)
HCT VFR BLD AUTO: 44 % (ref 35–47)
HGB BLD-MCNC: 14.1 G/DL (ref 11.7–15.7)
MCH RBC QN AUTO: 30.6 PG (ref 26.5–33)
MCHC RBC AUTO-ENTMCNC: 32 G/DL (ref 31.5–36.5)
MCV RBC AUTO: 95 FL (ref 78–100)
NT-PROBNP SERPL-MCNC: 3142 PG/ML (ref 0–450)
PLATELET # BLD AUTO: 840 10E9/L (ref 150–450)
POTASSIUM SERPL-SCNC: 4.5 MMOL/L (ref 3.4–5.3)
RBC # BLD AUTO: 4.61 10E12/L (ref 3.8–5.2)
SODIUM SERPL-SCNC: 136 MMOL/L (ref 133–144)
WBC # BLD AUTO: 7.3 10E9/L (ref 4–11)

## 2020-08-19 PROCEDURE — 78580 LUNG PERFUSION IMAGING: CPT

## 2020-08-19 PROCEDURE — 27210794 ZZH OR GENERAL SUPPLY STERILE: Performed by: INTERNAL MEDICINE

## 2020-08-19 PROCEDURE — 93451 RIGHT HEART CATH: CPT | Performed by: INTERNAL MEDICINE

## 2020-08-19 PROCEDURE — 36415 COLL VENOUS BLD VENIPUNCTURE: CPT | Performed by: INTERNAL MEDICINE

## 2020-08-19 PROCEDURE — A9540 TC99M MAA: HCPCS | Performed by: INTERNAL MEDICINE

## 2020-08-19 PROCEDURE — C1894 INTRO/SHEATH, NON-LASER: HCPCS | Performed by: INTERNAL MEDICINE

## 2020-08-19 PROCEDURE — 93451 RIGHT HEART CATH: CPT | Mod: 26 | Performed by: INTERNAL MEDICINE

## 2020-08-19 PROCEDURE — 25800030 ZZH RX IP 258 OP 636: Performed by: INTERNAL MEDICINE

## 2020-08-19 PROCEDURE — 40000166 ZZH STATISTIC PP CARE STAGE 1

## 2020-08-19 PROCEDURE — 85027 COMPLETE CBC AUTOMATED: CPT | Performed by: INTERNAL MEDICINE

## 2020-08-19 PROCEDURE — 83880 ASSAY OF NATRIURETIC PEPTIDE: CPT | Performed by: INTERNAL MEDICINE

## 2020-08-19 PROCEDURE — 25000125 ZZHC RX 250: Performed by: INTERNAL MEDICINE

## 2020-08-19 PROCEDURE — 27210788 ZZH MANIFOLD CR3: Performed by: INTERNAL MEDICINE

## 2020-08-19 PROCEDURE — 34300033 ZZH RX 343: Performed by: INTERNAL MEDICINE

## 2020-08-19 PROCEDURE — 80048 BASIC METABOLIC PNL TOTAL CA: CPT | Performed by: INTERNAL MEDICINE

## 2020-08-19 RX ORDER — LIDOCAINE 40 MG/G
CREAM TOPICAL
Status: COMPLETED | OUTPATIENT
Start: 2020-08-19 | End: 2020-08-19

## 2020-08-19 RX ADMIN — LIDOCAINE: 40 CREAM TOPICAL at 09:50

## 2020-08-19 RX ADMIN — KIT FOR THE PREPARATION OF TECHNETIUM TC 99M ALBUMIN AGGREGATED 6 MILLICURIE: 2.5 INJECTION, POWDER, FOR SOLUTION INTRAVENOUS at 12:51

## 2020-08-19 ASSESSMENT — MIFFLIN-ST. JEOR: SCORE: 1236.12

## 2020-08-19 NOTE — IP AVS SNAPSHOT
MRN:[id number]                      After Visit Summary   8/19/2020    Patty Rivera    MRN: [id number]           Visit Information        Department      8/19/2020  8:23 AM [facility]          Review of your medicines      UNREVIEWED medicines. Ask your doctor about these medicines       Dose / Directions   amLODIPine 10 MG tablet  Commonly known as:  NORVASC      Dose:  10 mg  Take 10 mg by mouth daily  Refills:  0     atorvastatin 10 MG tablet  Commonly known as:  LIPITOR      Dose:  20 mg  Take 20 mg by mouth daily  Refills:  0     betamethasone dipropionate 0.05 % external ointment  Commonly known as:  DIPROSONE      Apply topically 2 times daily  Refills:  0     furosemide 20 MG tablet  Commonly known as:  LASIX      Dose:  20 mg  Take 20 mg by mouth daily 3 tabs in AM & 1 in pm  Refills:  0     levothyroxine 25 MCG capsule  Commonly known as:  TIROSINT      Dose:  88 mcg  Take 88 mcg by mouth daily  Quantity:  30 capsule  Refills:  0     losartan 100 MG tablet  Commonly known as:  COZAAR      Dose:  100 mg  Take 100 mg by mouth daily  Refills:  0              Protect others around you: Learn how to safely use, store and throw away your medicines at www.disposemymeds.org.       Follow-ups after your visit       Your next 10 appointments already scheduled    Aug 19, 2020 12:00 PM CDT  NM LUNG SCAN VENTILATION AND PERFUSION with U[facility]  [facility] ([facility] [place]) [address] [id number] [phone]   How do I prepare for my exam? (Food and drink instructions)  No Food and Drink Restrictions.    How do I prepare for my exam? (Other instructions)  If you have not had a Chest X-Ray within 24 hrs (or X-Ray is not available to the Radiologists to visually view), a Chest X-Ray will be done prior to the Lung Scan.    What should I wear: You should wear comfortable clothes. Leave  your valuables at home.    How long does the exam take:  A Lung Scan will take anywhere from 30-90 minutes    What should I bring: Please bring a list of your medicines to the exam. (Include vitamins, minerals and over-the-counter drugs.) Please bring related prior results and films.    Do I need a :  No  is needed.    What do I need to tell my doctor?  Tell your doctor if you are breastfeeding or if there is any chance you may be pregnant.  If you have had a barium test within the past few days. Barium may change the results of certain exams.  If you think you may need sedation (medicine to help you relax).    What should I do after the exam: No restrictions, you may resume normal activities. The radioactive fluid will leave your body when you urinate (use the toilet). If you drink extra fluids, it will leave your body faster.    What is this test: This scan checks how lungs are working. There are potentially two parts to this exam.  One of the portions will show us blood supply to your lungs.  This is done by placing an IV (tiny needle) in your hand or arm. We inject the radioactive fluid through this needle.  We take a set of pictures.  The other potion of the exam will show us the air distribution to your lungs.  This is done by having you breathe a radioactive mist for 4-7 minutes.  Another set of pictures are taken.    Who should I call with questions: Please call your Imaging Department at your exam site with any questions. Directions, parking instructions, and other information are available on our website, [email or web address].        Care Instructions       Further instructions from your care team       [facility]                        Interventional Cardiology  Discharge Instructions   Post Right Heart Cath      AFTER YOU GO HOME:    DO drink plenty of fluids    DO resume your regular diet and medications unless otherwise instructed by your Primary Physician    Do Not  scrub the procedure site vigorously    No lotion or powder to the puncture site for 3 days    CALL YOUR PRIMARY PHYSICIAN IF: You may resume all normal activity.  Monitor neck site for bleeding, swelling, or voice changes. If you notice bleeding or swelling immediately apply pressure to the site and call number below to speak with Cardiology Fellow.  If you experience any changes in your breathing you should call your doctor immediately or come to the closest Emergency Department.  Do not drive yourself.    ADDITIONAL INSTRUCTIONS: Medications: You are to resume all home medications including anticoagulation therapy unless otherwise advised by your primary cardiologist or nurse coordinator.    Follow Up: Per your primary cardiology team    If you have any questions or concerns regarding your procedure site please call [phone] at anytime and ask for Cardiology Fellow on call.  They are available 24 hours a day.  You may also contact the Cardiology Clinic after hours number at [phone].                                                       Telephone Numbers [phone] Monday-Friday 8:00 am to 4:30 pm    [phone] After 4:30 pm Monday-Friday, Weekends & Holidays  Ask for Interventional Cardiologist on call. Someone is on call 24 hours/day   [facility] toll free number [phone] Monday-Friday 8:00 am to 4:30 pm   [facility] Emergency Dept [phone]                   Additional Information About Your Visit       MyChart Information    [employer] gives you secure access to your electronic health record. If you see a primary care provider, you can also send messages to your care team and make appointments. If you have questions, please call your primary care clinic.  If you do not have a primary care provider, please call [phone] and they will assist you.       Care EveryWhere ID    This is your Care EveryWhere ID. This could be used by other organizations to access your [facility]  records  [id number]       Your Vitals Were  Most recent update: 8/19/2020  9:45 AM    Blood Pressure   138/61 (BP Location: Left arm)          Pulse   78          Temperature   97.9  F (36.6  C) (Oral)          Respirations   16          Height   1.524 m (5')             Weight   83.5 kg (184 lb)    Pulse Oximetry   96%    BMI (Body Mass Index)   35.94 kg/m           Primary Care Provider Office Phone # Fax #    [facility] [phone]      Equal Access to Services    REINA WREN : Hadii aad ku hadasho Soomaali, waaxda luqadaha, qaybta kaalmada adeegyada, waxcali idiin hayaan adeangel valerio . So [facility] [phone].    ATENCIÓN: Si habla español, tiene a randhawa disposición servicios gratuitos de asistencia lingüística. Alexame al [phone].    We comply with applicable federal and state civil rights laws, including the [place] Human Rights Act. We do not discriminate on the basis of race, color, creed, Christianity, national origin, marital status, age, disability, sex, sexual orientation, or gender identity.       Thank you!    Thank you for choosing [facility] for your care. Our goal is always to provide you with excellent care. Hearing back from our patients is one way we can continue to improve our services. Please take a few minutes to complete the written survey that you may receive in the mail after you visit with us. Thank you!            Medication List      ASK your doctor about these medications          Morning Afternoon Evening Bedtime As Needed    amLODIPine 10 MG tablet  Also known as:  NORVASC  INSTRUCTIONS:  Take 10 mg by mouth daily                     atorvastatin 10 MG tablet  Also known as:  LIPITOR  INSTRUCTIONS:  Take 20 mg by mouth daily                     betamethasone dipropionate 0.05 % external ointment  Also known as:  DIPROSONE  INSTRUCTIONS:  Apply topically 2 times daily                     furosemide 20 MG tablet  Also known as:  LASIX  INSTRUCTIONS:  Take 20 mg by  mouth daily 3 tabs in AM & 1 in pm                     levothyroxine 25 MCG capsule  Also known as:  TIROSINT  INSTRUCTIONS:  Take 88 mcg by mouth daily                     losartan 100 MG tablet  Also known as:  COZAAR  INSTRUCTIONS:  Take 100 mg by mouth daily

## 2020-08-19 NOTE — DISCHARGE INSTRUCTIONS
[facility]                        Interventional Cardiology  Discharge Instructions   Post Right Heart Cath      AFTER YOU GO HOME:    DO drink plenty of fluids    DO resume your regular diet and medications unless otherwise instructed by your Primary Physician    Do Not scrub the procedure site vigorously    No lotion or powder to the puncture site for 3 days    CALL YOUR PRIMARY PHYSICIAN IF: You may resume all normal activity.  Monitor neck site for bleeding, swelling, or voice changes. If you notice bleeding or swelling immediately apply pressure to the site and call number below to speak with Cardiology Fellow.  If you experience any changes in your breathing you should call your doctor immediately or come to the closest Emergency Department.  Do not drive yourself.    ADDITIONAL INSTRUCTIONS: Medications: You are to resume all home medications including anticoagulation therapy unless otherwise advised by your primary cardiologist or nurse coordinator.    Follow Up: Per your primary cardiology team    If you have any questions or concerns regarding your procedure site please call [phone] at anytime and ask for Cardiology Fellow on call.  They are available 24 hours a day.  You may also contact the Cardiology Clinic after hours number at [phone].                                                       Telephone Numbers [phone] Monday-Friday 8:00 am to 4:30 pm    [phone] After 4:30 pm Monday-Friday, Weekends & Holidays  Ask for Interventional Cardiologist on call. Someone is on call 24 hours/day   [facility] toll free number [phone] Monday-Friday 8:00 am to 4:30 pm   [facility] Emergency Dept [phone]

## 2020-08-19 NOTE — PROGRESS NOTES
[facility]   Interventional Cardiology        Consenting/Education for Right Heart Catheterization     Patient understands as a part of routine surveillance for pulmonary hypertension we would like to perform coronary angiogram/right heart catheterization/endomyocardial biopsy.  This procedure will be performed by Dr. Hill    Patient understands during the portion for right heart catheterization a fine tube (catheter) is put into the vein of the groin/neck.  It is carefully passed along until it reaches the heart and then goes up into the blood vessels of the lungs. This is done to measure a variety of pressures in your heart and can tell us how well the heart is filling and emptying, as well as monitor fluid status.    Patient also understands risks and complications of the procedure which include, but are not limited to bruising/swelling around the incision site, infection, bleeding, or allergic reaction to local anesthetic     Patient verbalized understanding of risks and benefits of the  right heart catheterization and has elected to proceed with the procedure.     Massiel Miguel, APRN, CNP  [facility] Interventional Cardiology  [phone]

## 2020-08-19 NOTE — PROGRESS NOTES
We had the pleasure of seeing Ms. Patty Rivera for followup in our pulmonary hypertension clinic outreach clinic in [place]. As you know, she is a very pleasant 80-year-old female with past medical history significant for:    1. Hypertension.  2. Diabetes.  3. Obesity.  4. Obstructive sleep apnea.  5. Mild group 3 pulmonary hypertension secondary to obstructive sleep apnea.     We usually see her in our [place] outreach clinic.  This past spring when she returned from [place] she had significant worsening of her shortness of breath with exertion.  She was desaturating significantly with exertion.  She also had significant lower extremity swelling and weight gain.  Her repeat echocardiogram showed worsening right ventricular size and function and right ventricular systolic pressure concerning for worsening of her pulmonary potential.    Clinically, her symptoms improved significantly with aggressive diuresis, salt and fluid restriction, and physical therapy.  Her oxygen  Desaturations are also significantly improved.  She able to ambulate more now in the last several weeks.    Although she is clinically getting better given her worsening RV size and function on the echocardiogram I recommended her to have a repeat right heart catheterization and VQ scan today.  She returns today for these testing.  Of note, she had a CT scan of the chest PE protocol that showed no acute pulmonary embolism or evidence of chronic thromboembolic bone hypertension.    Her repeat right heart catheterization today showed    RA: 9/14/9   RV: 80/10  PA: 87/30/(48)  PCWP: 15  PA sat: 65.3%  TD CO/CI: 6.6/3.7   Estimated Arely CO/CI: 3.5/1.9  PVR: 5.0  Measured Arely not done    Fluid Challenge (500 ml NS):  RA: 14  PA: 86/31 (54)  PCWP: 20  PA sat: 68.5%  TD CO/CI:  6.8/3.8  Estimated Arely CO/CI: 4.2/2.3  PVR: 5   Measured Arely not done    Her repeat VQ scan showed no evidence of chronic thromboembolic pulmonary pretension.    CBC RESULTS:    Recent Labs   Lab Test 08/19/20  0835   WBC 7.3   RBC 4.61   HGB 14.1   HCT 44.0   MCV 95   MCH 30.6   MCHC 32.0   RDW 13.7   *     Recent Labs   Lab Test 08/19/20  0835 08/23/18  0900    140   POTASSIUM 4.5 4.5   CHLORIDE 103 106   CO2 28 27   ANIONGAP 5 7   * 132*   BUN 55* 31*   CR 1.24* 1.34*   NILE 10.0 9.3     No results found for: NTBNPI  Lab Results   Component Value Date    NTBNP 3,142 (H) 08/19/2020     Assessment and Plan:   1.  Severe pulmonary hypertension likely due to combination of obesity obstructive sleep apnea and diastolic dysfunction  2.  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  RV dysfunction with preserved cardiac output and normal right-sided filling pressure on diuretics    Discussed the option of off label use of pulmonary vasodilator therapy.  Discussed the limited evidence available with respect to symptomatic benefit.  As she is feeling better on salt and fluid restriction and diuretics, she would prefer to avoid this which I think is reasonable.    #1 I have recommended her to have a repeat sleep study to make sure her CPAP settings are appropriate to appropriately treat her obstructive sleep apnea.  #2 strict salt restriction to 2 g/day and fluid restriction to 60 ounces a day  #3 continue Lasix 40 mg in the morning and 20 mg in the evening take an extra 20 mg in the evening if she were to have any worsening lower extremity swelling or abdominal distention  #4 Mandaeism use of supplemental oxygen therapy during sleep and during exertion.  Okay for patient to have her oxygen taken off at rest in first accentuation is more than 90%.  #5 return to clinic in 3 months.  If she were to have significant deterioration in exercise capacity will consider pulmonary vasodilator therapy to treat her precapillary competent from obstructive sleep apnea.    Sincerely,  Sergio Hill MD   [facility] Pul[facility] Hypertension  Heart Failure,  Transplant, and Mechanical Circulatory Support Cardiology   Cardiovascular Division  [facility]   [phone]

## 2020-08-19 NOTE — IP AVS SNAPSHOT
[facility],  Heart Cath Lab  [address] [address]  Phone:  [phone]                                    After Visit Summary   8/19/2020    Patty Rivera    MRN: [id number]           After Visit Summary Signature Page    I have received my discharge instructions, and my questions have been answered. I have discussed any challenges I see with this plan with the nurse or doctor.    ..........................................................................................................................................  Patient/Patient Representative Signature      ..........................................................................................................................................  Patient Representative Print Name and Relationship to Patient    ..................................................               ................................................  Date                                   Time    ..........................................................................................................................................  Reviewed by Signature/Title    ...................................................              ..............................................  Date                                               Time          22EPIC Rev 08/18

## 2020-08-19 NOTE — PROGRESS NOTES
Pt arrived on 2a post RHC. VSS O2 2L. Right neck c/d/i. No pain. Discharge instructions reviewed, copy given to pt. Pt eating and drinking. Family at bedside. Dr. Reinoso here to speak with pt.

## 2020-08-20 ENCOUNTER — TELEPHONE (OUTPATIENT)
Dept: CARDIOLOGY | Facility: CLINIC | Age: 80
End: 2020-08-20

## 2020-08-20 NOTE — TELEPHONE ENCOUNTER
----- Message from Latisah Quinn RN sent at 8/20/2020  7:57 AM CDT -----    ----- Message -----  From: Sergio Hill MD  Sent: 8/19/2020   6:22 PM CDT  To: Hilary Mazariegos, Cardiology Ph Nurse-    Team:  She is a [place] patient - not on PH medications.     She does have genuine PH likely related to MATILDA/Diastolic dysfunction.     Plan:  1. NO PH medications for now  2. Fluid and salt restriction  3. Oxygen 24/7  4. Repeat sleep study - patient to call her sleep clinic and set this up  5. Follow up with me in [place] in 3 months - carmen schedule this appointment.     Thank you. Please let me know if you have any questions.     TT

## 2020-08-21 ENCOUNTER — TELEPHONE (OUTPATIENT)
Dept: CARDIOLOGY | Facility: CLINIC | Age: 80
End: 2020-08-21

## 2020-08-21 NOTE — TELEPHONE ENCOUNTER
Note from Sleep Clinic on Patty Brambila is a patient of Bria Ring CNP who completed a telehealth visit on 6/3/20, for follow up of MATILDA, on CPAP. At that appointment, it was noted that Patty's CPAP therapy was going well and her leak and AHI were well controlled. The plan was for Patty to follow up in one year.         Received the below message from Patty. Her current download in [employer], is as follows:         Patty Rivera    Device: DreamStation Auto CPAP    YOB: 1940         Compliance Summary    7/21/2020 - 8/19/2020 (30 days)    Days with Device Usage 30 days    Average Usage (Days Used) 9 hrs. 44 mins. 59 secs.    Percent of Days with Usage >= 4 Hours 100.0%         Average AHI 0.9         Auto-CPAP Summary    Auto-CPAP Mean Pressure 10.5 cmH2O    Auto-CPAP Peak Average Pressure 11.2 cmH2O    Average Device Pressure <= 90% of Time 11.7 cmH2O    Average Time in Large Leak Per Day 1 mins. 6 secs.         Device Settings as of 8/19/2020    AutoCPAP - A-Flex    Min Pressure 10 cmH2O    Max Pressure 15 cmH2O    A-Flex Setting 2    Ramp Type SmartRamp    Ramp Time 30 minutes    Ramp Start Pressure 8.0 cmH2O         Per the above download, Patty's usage is great and her AHI and leak are both very well controlled. Being that she was just seen in June, there is no need for her to come in for a visit, unless she is having issues with her CPAP.         Contacted Patty to review her download and let her know that an appointment is not warranted, unless she would like one.         Patty tells me that Dr. Hill thought it would be a good idea to be seen, to make sure her CPAP therapy was going well. I let her know that I will send my note over to his office, to make sure he knows that everything looks great. Will cancel her September appointment. Patty was very appreciative of the phone call and has no questions. She was encouraged to contact the Sleep Clinic if any issues arise.

## 2020-11-05 ENCOUNTER — PATIENT OUTREACH (OUTPATIENT)
Dept: CARDIOLOGY | Facility: CLINIC | Age: 80
End: 2020-11-05

## 2020-11-05 ENCOUNTER — MEDICAL CORRESPONDENCE (OUTPATIENT)
Dept: CARDIOLOGY | Facility: CLINIC | Age: 80
End: 2020-11-05

## 2020-11-05 RX ORDER — FUROSEMIDE 20 MG
20 TABLET ORAL 2 TIMES DAILY
Qty: 120 TABLET | Refills: 3 | COMMUNITY
Start: 2020-11-05 | End: 2021-01-01

## 2020-11-05 NOTE — TELEPHONE ENCOUNTER
Pt was seen in [place] for follow up with Dr. Hill on 11/5/2020.    Plan:  Decrease lasix to 20 mg twice daily, ok to take an extra 20 mg for increased swelling or wt gain.      Labs today - If dehydrated she will need labs in 2 weeks (let me know and I can put the order into the [employer] system)    3 month follow up with Labs in [place]    I placed all orders and reviewed all testing with the pt.     Additional Action Needed:  follow up on [facility] Testing and Appointment Schedule:  NA

## 2020-11-10 NOTE — TELEPHONE ENCOUNTER
Message sent to Dr. Hill to follow up on results and see if additional draws are needed. Latisha Quinn RN on 11/10/2020 at 8:27 AM    Per Dr. Hill, repeat labs in 2 weeks to follow up on Cr. Latisha Quinn RN on 11/10/2020 at 10:39 AM    LM with patient advising that Dr. Hill has reviewed labs and Cr is elevated; would like a repeat BMP completed the week of Nov 23rd. Advised I sent out a message to the [place] nurses to help coordinate this lab draw, but asked that the patient reach out if she has not heard from them by the end of the week. Latisha Quinn RN on 11/10/2020 at 10:42 AM    Email sent to Tobias to schedule lab appt. Latisha Quinn RN on 11/10/2020 at 10:43 AM  ______________________________________    2 week F/U lab results reviewed with Dr. Hill and WNL. Confirmed with patient and advised no change in plan. Patient verbalized understanding and did not have any further questions. Latisha Quinn RN on 12/10/2020 at 1:53 PM

## 2020-11-27 ENCOUNTER — TRANSFERRED RECORDS (OUTPATIENT)
Dept: HEALTH INFORMATION MANAGEMENT | Facility: CLINIC | Age: 80
End: 2020-11-27

## 2020-12-04 ENCOUNTER — TRANSFERRED RECORDS (OUTPATIENT)
Dept: HEALTH INFORMATION MANAGEMENT | Facility: CLINIC | Age: 80
End: 2020-12-04

## 2021-01-01 ENCOUNTER — TELEPHONE (OUTPATIENT)
Dept: CARDIOLOGY | Facility: CLINIC | Age: 81
End: 2021-01-01

## 2021-01-01 ENCOUNTER — HEALTH MAINTENANCE LETTER (OUTPATIENT)
Age: 81
End: 2021-01-01

## 2021-01-01 ENCOUNTER — TELEPHONE (OUTPATIENT)
Dept: CARDIOLOGY | Facility: CLINIC | Age: 81
End: 2021-01-01
Payer: MEDICARE

## 2021-01-01 ENCOUNTER — NURSE TRIAGE (OUTPATIENT)
Dept: NURSING | Facility: CLINIC | Age: 81
End: 2021-01-01

## 2021-01-01 DIAGNOSIS — J84.9 ILD (INTERSTITIAL LUNG DISEASE) (H): ICD-10-CM

## 2021-01-01 DIAGNOSIS — I27.20 PULMONARY HYPERTENSION (H): Primary | ICD-10-CM

## 2021-01-01 DIAGNOSIS — R06.02 SOB (SHORTNESS OF BREATH): ICD-10-CM

## 2021-01-01 DIAGNOSIS — I27.20 PULMONARY HYPERTENSION (H): ICD-10-CM

## 2021-01-01 RX ORDER — FUROSEMIDE 20 MG
20 TABLET ORAL DAILY
Qty: 120 TABLET | Refills: 3 | COMMUNITY
Start: 2021-01-01 | End: 2022-01-01

## 2021-01-01 RX ORDER — SILDENAFIL CITRATE 20 MG/1
20 TABLET ORAL 3 TIMES DAILY
Qty: 90 TABLET | Refills: 11 | Status: SHIPPED | OUTPATIENT
Start: 2021-01-01 | End: 2022-01-01 | Stop reason: ALTCHOICE

## 2021-01-01 RX ORDER — SILDENAFIL CITRATE 20 MG/1
20 TABLET ORAL 3 TIMES DAILY
COMMUNITY
Start: 2021-01-01 | End: 2021-01-01

## 2021-01-03 ENCOUNTER — HEALTH MAINTENANCE LETTER (OUTPATIENT)
Age: 81
End: 2021-01-03

## 2021-02-02 ENCOUNTER — PATIENT OUTREACH (OUTPATIENT)
Dept: CARDIOLOGY | Facility: CLINIC | Age: 81
End: 2021-02-02

## 2021-02-02 ENCOUNTER — MEDICAL CORRESPONDENCE (OUTPATIENT)
Dept: CARDIOLOGY | Facility: CLINIC | Age: 81
End: 2021-02-02

## 2021-02-02 DIAGNOSIS — E87.5 HYPERKALEMIA: Primary | ICD-10-CM

## 2021-02-02 RX ORDER — SODIUM POLYSTYRENE SULFONATE 15 G/60ML
30 SUSPENSION ORAL; RECTAL ONCE
Qty: 120 ML | Refills: 0 | Status: SHIPPED | OUTPATIENT
Start: 2021-02-02 | End: 2021-02-02

## 2021-02-02 NOTE — TELEPHONE ENCOUNTER
Pt was seen in [place] for follow up with Dr. Hill on 2/2/2021.    Plan:  Kayexalate 30 g once    Decrease losartan to 50 mg Daily     Avoid Potassium rich foods    Check BP Daily and call with Results    Recheck K this Friday - Orders faxed to [facility]    I placed all orders and reviewed all testing with the pt.     Additional Action Needed:  Follow up on Labs and BP    Follow up with be determined at this time as to when he wants to see her back.    [place] Testing and Appointment Schedule:  JULIANE

## 2021-02-04 ENCOUNTER — TRANSFERRED RECORDS (OUTPATIENT)
Dept: HEALTH INFORMATION MANAGEMENT | Facility: CLINIC | Age: 81
End: 2021-02-04

## 2021-02-05 LAB
BUN SERPL-MCNC: 34 MG/DL
CALCIUM SERPL-MCNC: 9.8 MG/DL
CHLORIDE SERPLBLD-SCNC: 101 MMOL/L
CO2 SERPL-SCNC: 29 MMOL/L
CREAT SERPL-MCNC: 1.1 MG/DL
GFR SERPL CREATININE-BSD FRML MDRD: 48 ML/MIN/1.73M2
GLUCOSE SERPL-MCNC: 204 MG/DL (ref 70–99)
POTASSIUM SERPL-SCNC: 5.9 MMOL/L
SODIUM SERPL-SCNC: 138 MMOL/L

## 2021-02-05 NOTE — TELEPHONE ENCOUNTER
K recheck 5.9 (2/4/21). Per Dr. Hill, patient needs to go to ED for treatment and to hold Cozaar until K normalizes. Lab recheck in 1 week. Latisha Quinn RN on 2/5/2021 at 8:07 AM    Spoke with patient and explained plan. Asked patient to head to the ED this morning and to hold losartan over the weekend. Patient verbalized understanding and will call me if her BP increases. Advised that I will call on Monday to follow up. Patient did not have any additional questions at this time. Latisha Quinn RN on 2/5/2021 at 8:12 AM    Patient called and advised she went to [facility] and K recheck was 4.5 and no treatment was needed. Asked patient to take her Cozaar then and to call me with any issues. Patient verbalized understanding and did not have any further questions. Latisha Quinn RN on 2/5/2021 at 12:38 PM

## 2021-02-08 ENCOUNTER — TELEPHONE (OUTPATIENT)
Dept: CARDIOLOGY | Facility: CLINIC | Age: 81
End: 2021-02-08

## 2021-02-08 RX ORDER — LOSARTAN POTASSIUM 100 MG/1
100 TABLET ORAL DAILY
COMMUNITY
Start: 2021-02-08 | End: 2022-01-01 | Stop reason: ALTCHOICE

## 2021-02-08 NOTE — TELEPHONE ENCOUNTER
Spoke with patient regarding BP log; patient provided the following readings:    2/3 - HR 70, 153/79  2/4 - HR 62, 138/75  2/5 - HR 67, 149/78  2/6 - HR 67, 155/75  2/7 - HR 66, 153/75  2/8 - HR 71, 145/75    Advised that I would forward on this information to Dr. Hill and call the patient back with a follow up plan. Patient verbalized understanding and did not have any further questions. Latisha Quinn RN on 2/8/2021 at 10:27 AM  ________________________________________    No changes. Will continue to monitor     Thank you     TT   _________________________________________    LM with patient regarding recommendations and asked patient to call me with any questions or concerns. Latisha Quinn RN on 2/8/2021 at 2:58 PM

## 2021-03-22 ENCOUNTER — TELEPHONE (OUTPATIENT)
Dept: CARDIOLOGY | Facility: CLINIC | Age: 81
End: 2021-03-22

## 2021-03-22 DIAGNOSIS — R06.09 DYSPNEA ON EXERTION: ICD-10-CM

## 2021-03-22 DIAGNOSIS — I27.20 PULMONARY HYPERTENSION (H): Primary | ICD-10-CM

## 2021-03-22 DIAGNOSIS — R06.02 SOB (SHORTNESS OF BREATH): ICD-10-CM

## 2021-03-22 NOTE — TELEPHONE ENCOUNTER
Clarified with patient that she is requesting a physical therapy referral be faxed to [facility] in [place], [place]. Patient has not been able to get around last year d/t COVID and is noticing her T[place] is slightly worse than last year. Latisha Quinn RN on 3/22/2021 at 10:37 AM    Orders faxed to (f) [phone] per patient's request. Latisha Quinn RN on 3/22/2021 at 10:38 AM      [facility] Call Center    Phone Message    May a detailed message be left on voicemail: yes     Reason for Call: Other: Pt would like a call back to see if she can therapy orders afain as she feels it worked well last spring and would like to do it again. Please reach out to pt to discuss     Action Taken: Message routed to:  [facility] Cardio    Travel Screening: Not Applicable

## 2021-04-25 ENCOUNTER — HEALTH MAINTENANCE LETTER (OUTPATIENT)
Age: 81
End: 2021-04-25

## 2021-08-14 ENCOUNTER — HEALTH MAINTENANCE LETTER (OUTPATIENT)
Age: 81
End: 2021-08-14

## 2021-09-22 NOTE — TELEPHONE ENCOUNTER
Advised I would fax over orders this afternoon. Asked patient to call [facility] scheduling line for an overdue follow up with Dr. Hill. Patient advised she will call right after we get off the phone together. Latisha Quinn RN on 9/22/2021 at 12:47 PM    PT orders faxed to (f) [phone]. Latisha Quinn RN on 9/22/2021 at 12:48 PM    [facility] Call Center    Phone Message    May a detailed message be left on voicemail: no     Reason for Call: Other: Patty called to request an order for PT (physical therapy). She would like to do therapy at [facility] in [place]. Please reach out to Patty with any questions.      Action Taken: Message routed to:  [facility] Cardiology    Travel Screening: Not Applicable

## 2021-09-23 NOTE — TELEPHONE ENCOUNTER
Service contacted:   Sergio/ Pulmonary hypertension:Outreach in [place].  Meds: Lasix,levothyroxine, amlodipine, losartan    PMH: 1. Hypertension.  2. Diabetes.  3. Obesity.  4. Obstructive sleep apnea.  5. Moderate pulmonary hypertension secondary to obstructive sleep apnea and heart failure with preserved ejection fraction    Today's concern:   Shortness of breath and decreased saturations with activity over past 2 days, down to 70's even while on level 5 of O2 concentrator     Mom has device during day to help her breathe, CPAP with 02 at night per daughter, Lula.   Usually on level 4 when ambulating or humid, poor air quality.  2 days ago she could go to  without any support from device and no desaturations.    The last 2 days she stays at level 5 even at rest.  Her saturations go to 90s at rest  If up and  and ambulating at all she dips down to 70's.   - Lula reports s trial of elimination to make sure CPAP/ devices are working    CPAP and o2 concentrator machine is new  -CPAP at night: this has O2- the O2 level was adjusted 2 days ago- and they will turn up the device  -Hooking up to CPAP with O2 today she did come up to 90's  -These dips in @ sats are new and the machines seem to be properly functioning. functional    She wonders if she can be admitted via to [facility] for evaluation   Other sx reviewed:   - No chest pain or palpitations  - No Weight gain/edema: she has lost weight, no ankle swelling  -BP: good 128/80  -BG stable  -No fever, chills, rigors  -No blood in stool nor urine  -No change in appetite or fluid intake     Plan: High priority message to Cardiology for Dr Hill's nurse team at . Called Lula with update. If worsening at all or new sx, ED is indicated.    MYChart: Active     Follow up/ next appt:Sergio in 2.5 weeks/ [place]       Reason for Disposition    MODERATE difficulty breathing (e.g., speaks in phrases, SOB even at rest, pulse 100-120) of new onset or worse  than normal    Additional Information    Negative: Breathing stopped and hasn't returned    Negative: Choking on something    Negative: SEVERE difficulty breathing (e.g., struggling for each breath, speaks in single words, pulse > 120)    Negative: Bluish (or gray) lips or face    Negative: Passed out (i.e., fainted, collapsed and was not responding)    Negative: Difficult to awaken or acting confused (e.g., disoriented, slurred speech)    Negative: Stridor    Negative: Chest pain    Negative: Wheezing (high pitched whistling sound) and previous asthma attacks or use of asthma medicines    Negative: Difficulty breathing and only present when coughing    Negative: Fever > 103 F (39.4 C)    Protocols used: BREATHING DIFFICULTY-A-[place]

## 2021-09-23 NOTE — TELEPHONE ENCOUNTER
I notified Rishabh HERNANDEZ RN regarding this message and I will route this message to the PH team for further review.    Ranjan MARIE

## 2021-09-23 NOTE — TELEPHONE ENCOUNTER
Spoke with Lula who advised patient is not fluid volume overloaded and does not complain of any other symptoms other than sat drops during activity. Patient unable to increase 02 d/t POC limits. Lula stated that she put her mom on her CPAP and is feeling better; advised patient reach out to pulmonary team regarding getting a Rx for a different POC for increased 02 needs. Advised that if patient's symptoms get worse, it could be a sign of a PE, and patient should go to the ED immediately. Lula verbalized understanding; will call the pulmonologist first and will take the patient in to the ED tomorrow morning if saturations do no improve on CPAP. Latisha Quinn RN on 9/23/2021 at 3:24 PM

## 2021-09-29 NOTE — TELEPHONE ENCOUNTER
Unable to reach Lula or leave . Latisha Quinn RN on 9/29/2021 at 1:27 PM    Spoke with Patty regarding Lasix dosing; advised it was okay to take an additional 20 mg daily for the next 3 days if patient has increased swelling or edema. Patient verbalized understanding and did not have any further questions. Latisha Quinn RN on 9/29/2021 at 1:29 PM      [facility]    Phone Message    May a detailed message be left on voicemail: yes     Reason for Call: Other: Lula called in stating that after pt's last ED stay she was on an additional lasix pill per day for 3 days. Lula states that pt's symptoms have been a lot better since increase in lasix and is wondering if Dr. Hill would think it's ok to stay on the increased dose for another few days. Please call Lula back to discuss. If call back is not until tomorrow 9/30/21, please call pt instead     Action Taken: Message routed to:  [facility] Cardio    Travel Screening: Not Applicable

## 2021-10-19 NOTE — TELEPHONE ENCOUNTER
Pt was seen in [place] for follow up with Dr. Hill on 10/19/2021.    Plan:  Prednisone 20 mg daily for 3 days for gout  Take an extra dose of lasix for weight above 179 lbs    Echocardiogram locally at [facility]  6 month follow up in [place] with labs    I placed all orders and reviewed all testing with the pt.     Additional Action Needed:  Follow up on [facility] Testing and Appointment Schedule:     NA

## 2021-11-07 NOTE — TELEPHONE ENCOUNTER
----- Message from Sergio Hill MD sent at 11/6/2021  7:21 AM CDT -----  Team:    Please start PA for sildenafil 20 mg tid - PAH out of proportion to MATILDA    She is a [facility] patient. Please use my latest note from October.    Thank you    TT

## 2021-11-08 NOTE — TELEPHONE ENCOUNTER
PA Initiation    Medication: Sildenafil 20mg  Insurance Company: [employer] - Phone [phone] Fax [phone]  Pharmacy Filling the Rx: [facility] - [place], [place] - [address] [place] [address]  Start Date: 11/8/2021    *Prior authorization completed and submitted through CoverMyMeds for expedited review.

## 2021-11-10 NOTE — TELEPHONE ENCOUNTER
I called pt to follow up on why she thinks she should stop her water pill.  She stated that she saw  and he would like her to try a new medication that contained a water pill.  I reviewed the chart and it looks like he would like her to start Revatio (sildenafil) but I do not see any other medications.  Message sent to Sergio Hill MD To follow up.  Amira Gonzalez RN on 11/10/2021 at 3:16 PM      I verified with Sergio Hill MD that he only wants her to start the Revatio (sildenafil) and does not want her to stop the lasix.  I did give the pt a call letting her know and left a detailed voicemail.  Amira Gonzalez RN on 11/11/2021 at 1:38 PM

## 2021-11-10 NOTE — TELEPHONE ENCOUNTER
[facility] Call Center    Phone Message    May a detailed message be left on voicemail: yes     Reason for Call: Other: . pt is wondering if she should discontinue taking her water pill , please call Patty, thank you     Action Taken: Message routed to:  [facility] heart    Travel Screening: Not Applicable

## 2021-11-12 NOTE — TELEPHONE ENCOUNTER
Prior Authorization Approval    Authorization Effective Date: 1/1/2021  Authorization Expiration Date: 12/31/2022  Medication: Sildenafil 20mg - Approved  Approved Dose/Quantity: 90 tablets/30 days  Reference #: [id number]   Insurance Company: [employer] - Phone [phone] Fax [phone]:    Which Pharmacy is filling the prescription (Not needed for infusion/clinic administered): [facility] - [address]  ----------------------------  Insurance: [employer]  BIN: [id number]  PCN: N/A  ID#: [id number]  GRP#: N/A

## 2021-11-19 NOTE — TELEPHONE ENCOUNTER
[facility] Call Center    Phone Message    May a detailed message be left on voicemail: yes     Reason for Call: Other: Pt returning call from Dr. Sergio Valdez's nurse.  Pt states she was not able to understand the call back number left on VM but did state she has not received the new medication yet.  PLease call pt back and if need to leave a voice mail please leave call back number slower.     Action Taken: Message routed to:  [facility] Cardio    Travel Screening: Not Applicable

## 2021-11-19 NOTE — TELEPHONE ENCOUNTER
I called pt and let her know that I received her message and I will follow up with her next week.  Amira Gonzalez RN on 11/19/2021 at 3:18 PM    I spoke with pt and she stated that she received the medication Revatio (sildenafil) and started it.  I will follow up with her next week to see how she is doing.  Amira Gonzalez RN on 11/23/2021 at 4:11 PM

## 2021-11-30 NOTE — TELEPHONE ENCOUNTER
Spoke with pt regarding starting  Sildenafil 20 mg tid on 11/19.  Denied headache, epitaxies or flushing sensation  Does feel that pt breathing has improved. Did gain 3 # this past week. Weight on 11/26 was 179 and today was 183. Pt daughter felt that pt eye lids were swollen. Did take extra dose of Furosemide this am.

## 2021-11-30 NOTE — TELEPHONE ENCOUNTER
Spoke with Dr. Hill regarding weight gain. Advise that pt could take extra dose of Furosemide. At that time was not aware that pt had been told to decrease Furosemide to 20 mg daily. Spoke with pt. Is taking Furosemide 20 mg daily not 20 mg bid. Took extra dose of Furosemide 20 mg for the past 2 days. Pt should call on 12/1 with update on weight.

## 2021-12-01 NOTE — TELEPHONE ENCOUNTER
I called pt and she stated that she is back to 182.8 lbs.  She is back to taking 20 mg of lasix.  Amira Gonzalez RN on 12/1/2021 at 1:16 PM

## 2022-01-01 ENCOUNTER — TELEPHONE (OUTPATIENT)
Dept: CARDIOLOGY | Facility: CLINIC | Age: 82
End: 2022-01-01

## 2022-01-01 ENCOUNTER — TELEPHONE (OUTPATIENT)
Dept: CARDIOLOGY | Facility: CLINIC | Age: 82
End: 2022-01-01
Payer: MEDICARE

## 2022-01-01 ENCOUNTER — HEALTH MAINTENANCE LETTER (OUTPATIENT)
Age: 82
End: 2022-01-01

## 2022-01-01 ENCOUNTER — EXTERNAL ORDER RESULTS (OUTPATIENT)
Dept: CARDIOLOGY | Facility: CLINIC | Age: 82
End: 2022-01-01

## 2022-01-01 ENCOUNTER — VIRTUAL VISIT (OUTPATIENT)
Dept: CARDIOLOGY | Facility: CLINIC | Age: 82
End: 2022-01-01
Attending: PHYSICIAN ASSISTANT
Payer: MEDICARE

## 2022-01-01 ENCOUNTER — PATIENT OUTREACH (OUTPATIENT)
Dept: CARDIOLOGY | Facility: CLINIC | Age: 82
End: 2022-01-01

## 2022-01-01 DIAGNOSIS — I27.20 PULMONARY HYPERTENSION (H): Primary | ICD-10-CM

## 2022-01-01 DIAGNOSIS — R06.02 SOB (SHORTNESS OF BREATH): ICD-10-CM

## 2022-01-01 LAB
ANION GAP SERPL CALC-SCNC: 10 MMOL/L (ref 5–15)
BUN SERPL-MCNC: 47.2 MG/DL (ref 7–18)
CALCIUM (EXTERNAL): 9.5 MG/DL (ref 8.5–10.1)
CHLORIDE (EXTERNAL): 100.5 MMOL/L (ref 98–107)
CO2 (EXTERNAL): 28 MMOL/L (ref 21–32)
CREATININE (EXTERNAL): 1.77 MG/DL (ref 0.55–1.02)
GFR ESTIMATED (EXTERNAL): 28 ML/MIN/1.73M2
GFR ESTIMATED (IF AFRICAN AMERICAN) (EXTERNAL): ABNORMAL ML/MIN/1.73M2
GLUCOSE (EXTERNAL): 210.5 MG/DL (ref 70–99)
POTASSIUM (EXTERNAL): 4.3 MMOL/L (ref 3.5–5.1)
SODIUM (EXTERNAL): 138 MMOL/L (ref 136–145)

## 2022-01-01 PROCEDURE — 99214 OFFICE O/P EST MOD 30 MIN: CPT | Mod: 95 | Performed by: PHYSICIAN ASSISTANT

## 2022-01-01 PROCEDURE — G0463 HOSPITAL OUTPT CLINIC VISIT: HCPCS | Mod: PN,RTG | Performed by: PHYSICIAN ASSISTANT

## 2022-01-01 RX ORDER — LORAZEPAM 0.5 MG/1
TABLET ORAL
Qty: 10 TABLET | Refills: 0 | COMMUNITY
Start: 2022-01-01

## 2022-01-01 RX ORDER — FUROSEMIDE 20 MG
40 TABLET ORAL DAILY
Qty: 180 TABLET | Refills: 3 | Status: SHIPPED | OUTPATIENT
Start: 2022-01-01

## 2022-01-03 NOTE — TELEPHONE ENCOUNTER
Clarified with patient that she is requesting a physical therapy referral be faxed to [facility] in [place], [place]. Orders faxed to (f) [phone] per patient's request.  Amira Gonzalez RN on 1/3/2022 at 4:08 PM     Health Call Center    Phone Message    May a detailed message be left on voicemail: yes     Reason for Call: Other: Patty calling stating she needs a prescription sent to [facility] in [facility] for physical therapy. Thank you!     Action Taken: Message routed to:  [facility] Cardio    Travel Screening: Not Applicable

## 2022-04-05 NOTE — TELEPHONE ENCOUNTER
Patient was seen by Dr. Hill at [facility] on 4/5/2022.    Plan:  -RHC. Echo, 6mwt & labs in May @ [facility].  -VV f/u after    Additional Action needed:  Orders need to be placed.    [place] scheduling needs:   Needs to be on a monday      Plan was reviewed with the patient at time of the visit.  Patient verbalized understanding, agreed with plan and denied any further questions. Lashawn Yu RN on 4/5/2022 at 2:56 PM

## 2022-04-18 NOTE — TELEPHONE ENCOUNTER
Unable to reach patient;  asking Lula to call me back when able. Latisha Quinn RN on 4/18/2022 at 8:46 AM    Walking ~15 feet to bathroom and takes much longer for her oxygen to get up into the 90s; oxygen drops down to the 70s when ambulating. Currently using 7-8 L. Advised patient go up to 10 L with exertion; current tanks do not go that high. Will send for new Rx to [facility]. Asked daughter to call [employer] today for follow up. Scheduled patient for virtual visit with Millie to address worsening symptoms.  Latisha Quinn RN on 4/18/2022 at 9:47 AM    New oxygen Rx faxed to (f) [phone]. Latisha Quinn RN on 4/18/2022 at 9:56 AM      [facility] Call Center    Phone Message    May a detailed message be left on voicemail: yes     Reason for Call: Symptoms or Concerns     If patient has red-flag symptoms, warm transfer to triage line    Current symptom or concern: Lung issues    Symptoms have been present for:  3-4 week(s)    Has patient previously been seen for this? Yes    By : Thenappan    Please call patients daughter to discuss the profession of lung issues. Daughter would like suggestions. When ever patient moves her numbers crash. Numbers go from 96 to 70 rapidly, even if patient is just talking         Are there any new or worsening symptoms? Yes: progressing over the past few weeks      Action Taken: Message routed to:  Other:  cardiology    Travel Screening: Not Applicable

## 2022-04-25 NOTE — PROGRESS NOTES
Patty is a 81 year old who is being evaluated via a billable video visit.      How would you like to obtain your AVS? MyChart  If the video visit is dropped, the invitation should be resent by: Text to cell phone: [phone]   Will anyone else be joining your video visit? RADHA Baird/DILAN          CARDIOLOGY PH CLINIC VIDEO VISIT    Date of video visit: 04/26/22      Patty Rivera is a 81 year old female who is being evaluated via a billable video visit.        I have reviewed and updated the patient's Past Medical History, Social History, Family History and Medication List.    MEDICATIONS:  Current Outpatient Medications   Medication Sig Dispense Refill     amLODIPine (NORVASC) 10 MG tablet Take 10 mg by mouth daily       atorvastatin (LIPITOR) 10 MG tablet Take 20 mg by mouth daily       betamethasone dipropionate (DIPROSONE) 0.05 % ointment Apply topically 2 times daily       furosemide (LASIX) 20 MG tablet Take 1 tablet (20 mg) by mouth daily Ok to take an additional 20 mg for increase weight gain or edema 120 tablet 3     Levothyroxine Sodium 25 MCG CAPS Take 88 mcg by mouth daily  30 capsule      losartan (COZAAR) 100 MG tablet Take 1 tablet (100 mg) by mouth daily       sildenafil (REVATIO) 20 MG tablet Take 1 tablet (20 mg) by mouth 3 times daily 90 tablet 11       ALLERGIES  Actos [pioglitazone], Codeine, Hydrochlorothiazide, Metformin, Morphine, Penicillins, and Sulfamethoxazole      Self reported vitals:  Weight: 174#  /71  Sats 95%    Brief physical exam:  General: In no acute distress, upright and calm.  Eyes: No apparent redness or discharge.   Chest: No labored breathing, no cough during exam or audible wheezing.   Neuro: No obvious focal defects or tremors.   Psych: Alert and oriented. Does not appear anxious.     The rest of a comprehensive physical examination is deferred due to public health emergency video visit restrictions.       Primary PH cardiologist: Dr. Hill        HPI:  Ms. 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 She is maintained on monotherapy with sildenafil and follows in the [facility] with Dr. Hill.     She was seen last by Dr. Hill via virtual visit earlier this month. At that time she was at her baseline and a repeat RHC and echocardiogram were recommended along with a six minute walk test, though this has not yet been scheduled. However, last week, she called to report her oxygen levels were dropping more quickly at home. Based on her description of oxygen levels, we were able to get her a high flow concentrator at home in the interim, which now goes to 10L.    Today, we are meeting virtually to follow up. She tells me that upon just walking to the bathroom, despite 5L NC, her oxygen levels were dropping into the mid 70s and she would get quite winded. Now, she's feeling much better. She wears 8-10L with exertion and says sats stay high 80s/low 90s. She denies any new CP, palpitations, LE edema, or orthopnea. She has not had any dizziness or presyncope.     The patient did not have any new labs performed for our visit today, but most recent available labs were reviewed as below.       CURRENT PULMONARY HYPERTENSION REGIMEN:    PAH Rx: sildenafil 20mg TID    Diuretics: Lasix 20mg daily     Oxygen: 7L sitting, 8-10L with exertion    Anticoagulation: None    Pulm: Dr. Lau      Assessment/Plan:    1. Pulmonary hypertension.   --Ms. Rivera has moderate to severe group 2/3 PH felt secondary to a combination of obesity obstructive sleep apnea and diastolic dysfunction/HFpEF. On her echo last fall, she had moderate right ventricular dilatation with moderately reduced right ventricular function. Repeat hemodynamic testing has been recommended here at the [facility]. However, she is very hesitant as she is afraid she  will run out of oxygen due to her high requirements and can no longer use a portable concentrator. She essentially feels homebound currently. Will d/w Dr. Hill and reach back out with further recommendations.   --She denies any new LE edema or weight gain, on Lasix 20mg daily. Continue as is.   --Continue oxygen at 7-10L as above. She is checking her home oxygen levels frequently and currently sounds as if this is adequate. She follows with pulmonary locally, and I believe they plan to have her come in for a formal walk test in the near future if possible, to clarify her needs.    --Continue CPAP for known MATILDA.       Follow up plan: Will arrange for follow up in [place] pending discussions as above.       Testing/labs:    Most recent labs:   Reviewed via CE.    Other recent pertinent testing:    Echocardiogram (10/2021)  Moderate right ventricular dilatation with moderately reduced right ventricular function. Preserved left ventricular systolic function. No significant valvular abnormalities. No critical effusion.       RHC (08/2020)  RA: 9/14/9   RV: 80/10  PA: 87/30/(48)  PCWP: 15  PA sat: 65.3%  TD CO/CI: 6.6/3.7   Estimated Arely CO/CI: 3.5/1.9  PVR: 5.0  Measured Arely not done    Fluid Challenge (500 ml NS):  RA: 14  PA: 86/31 (54)  PCWP: 20  PA sat: 68.5%  TD CO/CI: 6.8/3.8  Estimated Arely CO/CI: 4.2/2.3  PVR: 5   Measured Arely not done      NYHA Functional Class:  3b      Video-Visit Details    Type of service:  Video Visit    Video Start Time: 0848  Video End Time: 0806    An additional 15 minutes was spent today performing chart and history review, pre and post visit documentation, and care coordination.      Originating Location (pt. Location): Home    Distant Location (provider location):  [facility] HEART [facility]    Platform used for Video Visit: Milvia Browne PA-C  [facility]  Pager [phone]

## 2022-04-26 NOTE — LETTER
4/26/2022      RE: Patty Rivera  [address] [place] [address]       Dear Colleague,    Thank you for the opportunity to participate in the care of your patient, Patty Rivera, at the [facility] [place] at [facility]. Please see a copy of my visit note below.    Patty is a 81 year old who is being evaluated via a billable video visit.      How would you like to obtain your AVS? MyChart  If the video visit is dropped, the invitation should be resent by: Text to cell phone: [phone]   Will anyone else be joining your video visit? RADHA Baird/DILAN          CARDIOLOGY PH CLINIC VIDEO VISIT    Date of video visit: 04/26/22      Patty Rivera is a 81 year old female who is being evaluated via a billable video visit.        I have reviewed and updated the patient's Past Medical History, Social History, Family History and Medication List.    MEDICATIONS:  Current Outpatient Medications   Medication Sig Dispense Refill     amLODIPine (NORVASC) 10 MG tablet Take 10 mg by mouth daily       atorvastatin (LIPITOR) 10 MG tablet Take 20 mg by mouth daily       betamethasone dipropionate (DIPROSONE) 0.05 % ointment Apply topically 2 times daily       furosemide (LASIX) 20 MG tablet Take 1 tablet (20 mg) by mouth daily Ok to take an additional 20 mg for increase weight gain or edema 120 tablet 3     Levothyroxine Sodium 25 MCG CAPS Take 88 mcg by mouth daily  30 capsule      losartan (COZAAR) 100 MG tablet Take 1 tablet (100 mg) by mouth daily       sildenafil (REVATIO) 20 MG tablet Take 1 tablet (20 mg) by mouth 3 times daily 90 tablet 11       ALLERGIES  Actos [pioglitazone], Codeine, Hydrochlorothiazide, Metformin, Morphine, Penicillins, and Sulfamethoxazole      Self reported vitals:  Weight: 174#  /71  Sats 95%    Brief physical exam:  General: In no acute distress, upright and calm.  Eyes: No apparent redness or  discharge.   Chest: No labored breathing, no cough during exam or audible wheezing.   Neuro: No obvious focal defects or tremors.   Psych: Alert and oriented. Does not appear anxious.     The rest of a comprehensive physical examination is deferred due to public [facility] emergency video visit restrictions.       Primary PH cardiologist: Dr. Hill       HPI:  Ms. 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 She is maintained on monotherapy with sildenafil and follows in the [facility] outreach clinic with Dr. Hill.     She was seen last by Dr. Hill via virtual visit earlier this month. At that time she was at her baseline and a repeat RHC and echocardiogram were recommended along with a six minute walk test, though this has not yet been scheduled. However, last week, she called to report her oxygen levels were dropping more quickly at home. Based on her description of oxygen levels, we were able to get her a high flow concentrator at home in the interim, which now goes to 10L.    Today, we are meeting virtually to follow up. She tells me that upon just walking to the bathroom, despite 5L NC, her oxygen levels were dropping into the mid 70s and she would get quite winded. Now, she's feeling much better. She wears 8-10L with exertion and says sats stay high 80s/low 90s. She denies any new CP, palpitations, LE edema, or orthopnea. She has not had any dizziness or presyncope.     The patient did not have any new labs performed for our visit today, but most recent available labs were reviewed as below.       CURRENT PULMONARY HYPERTENSION REGIMEN:    PAH Rx: sildenafil 20mg TID    Diuretics: Lasix 20mg daily     Oxygen: 7L sitting, 8-10L with exertion    Anticoagulation: None    Pulm: Dr. Lau      Assessment/Plan:    1. Pulmonary hypertension.   --Ms. Rivera has moderate to severe group 2/3 PH  felt secondary to a combination of obesity obstructive sleep apnea and diastolic dysfunction/HFpEF. On her echo last fall, she had moderate right ventricular dilatation with moderately reduced right ventricular function. Repeat hemodynamic testing has been recommended here at the [facility]. However, she is very hesitant as she is afraid she will run out of oxygen due to her high requirements and can no longer use a portable concentrator. She essentially feels homebound currently. Will d/w Dr. Hill and reach back out with further recommendations.   --She denies any new LE edema or weight gain, on Lasix 20mg daily. Continue as is.   --Continue oxygen at 7-10L as above. She is checking her home oxygen levels frequently and currently sounds as if this is adequate. She follows with pulmonary locally, and I believe they plan to have her come in for a formal walk test in the near future if possible, to clarify her needs.    --Continue CPAP for known MATILDA.       Follow up plan: Will arrange for follow up in [place] pending discussions as above.       Testing/labs:    Most recent labs:   Reviewed via CE.    Other recent pertinent testing:    Echocardiogram (10/2021)  Moderate right ventricular dilatation with moderately reduced right ventricular function. Preserved left ventricular systolic function. No significant valvular abnormalities. No critical effusion.       RHC (08/2020)  RA: 9/14/9   RV: 80/10  PA: 87/30/(48)  PCWP: 15  PA sat: 65.3%  TD CO/CI: 6.6/3.7   Estimated Arely CO/CI: 3.5/1.9  PVR: 5.0  Measured Arely not done    Fluid Challenge (500 ml NS):  RA: 14  PA: 86/31 (54)  PCWP: 20  PA sat: 68.5%  TD CO/CI: 6.8/3.8  Estimated Arely CO/CI: 4.2/2.3  PVR: 5   Measured Arely not done      NYHA Functional Class:  3b      An additional 15 minutes was spent today performing chart and history review, pre and post visit documentation, and care coordination.          Please do not hesitate to contact me if you have any  questions/concerns.     Sincerely,     FRANK Howell

## 2022-04-26 NOTE — NURSING NOTE
Chief Complaint   Patient presents with     Follow Up     Increased SOB     Patient denies any changes since echeck-in regarding medication and allergies and states all information entered during echeck-in remains accurate.    RADHA Leone/CMA

## 2022-04-26 NOTE — PATIENT INSTRUCTIONS
Thank you for visiting the Pulmonary Hypertension Clinic virtually today.      Today we discussed:   No changes today.  I will discuss with Dr. Hill your hesitancy to come to the [place] for testing, given your higher oxygen needs.   We will reach back out to you with further recommendations.           Additional Instructions:    1. Continue staying active and eat a heart healthy, low sodium diet.     2. Please keep current list of medications with you at all times.     3. Remember to weigh yourself daily after voiding and write it down on a log. If you have gained/lost 2 pounds overnight or 5 pounds in a week contact us for medication adjustments or further instructions.    4. Please call us immediately if you have syncope (fainting or passing out), chest pain, worsening edema (swelling or weight gain), or general worsening in how you are feeling.     --------------------------------------------------------------------------------------------------------------    If you have questions or concerns please contact us at:    Ramin Quinn RN, BSN   Annamarie Ramey (Schedule,Prior Auth)  Nurse Coordinator     Clinic   Pulmonary Hypertension   Pulmonary Hypertension  [facility]  [facility] [place] [facility]  (Phone)[phone]    (Phone) [phone]        (Fax) [phone]      ** Please note that you will NOT receive a reminder call regarding your scheduled testing, reminder calls are for provider appointments only.  If you are scheduled for testing within the [facility] system you may receive a call regarding pre-registration for billing purposes only.**     --------------------------------------------------------------------------------------------------------------    Interested in joining a support group?    Pulmonary Hypertension Association  Https://www.phassociation.org/  **Look at the Events Tab** They even have Support Groups that you can call  [facility] PH Support Group  Second Saturday of the Month from 1-3 PM   Location: [address] [place] [id number] (Currently Virtual)  Leader: Soledad Zimmerman   Phone: [phone]   Email: [email or web address]

## 2022-04-27 NOTE — TELEPHONE ENCOUNTER
Confirmed plan with patient. Orders placed and staff message sent to [facility] schedulers for follow up. Latisha Quinn RN on 4/27/2022 at 2:36 PM    ----- Message from Latisha Quinn RN sent at 4/27/2022  1:43 PM CDT -----  Regarding: FW: update, testing?    ----- Message -----  From: Millie Browne PA  Sent: 4/27/2022  12:55 PM CDT  To: Cardiology Ph Nurse-  Subject: FW: update, testing?                             Please see below about TT's [facility] patient.  Can we see if she is willing to at least come to [facility] to see him and do an echo/labs that visit if possible?    Reach out to her to let her know I spoke with TT as I told her I would.  Thanks  Millie            ----- Message -----  From: Sergio Hill MD  Sent: 4/27/2022  10:56 AM CDT  To: FRANK Howell  Subject: RE: update, testing?                             Thank you for seeing her.  However, it not sure why she was added on her schedule as I just saw her 3 weeks ago and there was no concerns.    Regardless, I think we should at least do an echocardiogram and repeat labs locally in [facility] as they want to see how she is doing on sildenafil monotherapy.  I have also not seen her in person for the last year which is worrisome.    TT  ----- Message -----  From: Millie Browne PA  Sent: 4/26/2022   9:08 AM CDT  To: Sergio Hill MD  Subject: update, testing?                                 Followed up via video today, she is feeling much better using up to 8-10L at home and sats staying better. Doesn't sound like any issues with heart failure/volume retention, etc.     You had wanted her to come have another RHC/echo/walk test here at the . She is very hesitant as she is really nervous she will run out of oxygen for the drive (2.5 hrs each way) because she no longer has a POC and would require all tanks. I told her I would review you with and see what you wanted to do.     Dorie Pinto

## 2022-07-05 NOTE — TELEPHONE ENCOUNTER
Pt was seen in [place] for follow up with Dr. Hill on 7/5/2022.    Plan:  Medication Changes:   Increase Lasix to 40 mg Daily  Hold Losartan - will determine restart after labs are reviewed next week    Follow up Appointment Information:  Labs next week  3 month follow up with Labs    I placed all orders and reviewed all testing with the pt.     Additional Action Needed:  Follow up on Labs next week  Follow up with Patricio DELATORRE to see if she can get a [facility]    [facility] Testing and Appointment Schedule:  JULIANE

## 2022-07-12 NOTE — TELEPHONE ENCOUNTER
Unable to reach patient; LM to follow up on when she is getting labs completed. Asked patient to call me back when able. Latisha Quinn RN on 7/12/2022 at 8:52 AM    Patient advised she has a lab appt scheduled for 1:30 PM on Monday at [facility]. Lab orders faxed to (f) [phone]. Latisha Quinn RN on 7/12/2022 at 9:49 AM    Reviewed labs with Dr. Hill; results WNL. No change in plan. Latisha Quinn RN on 7/19/2022 at 4:44 AM    Faxed POC orders to [facility] at (f) [phone]. Latisha Quinn RN on 7/19/2022 at 9:21 AM

## 2022-07-18 NOTE — TELEPHONE ENCOUNTER
Unable to connect with [facility] pharmacist; LM to call back for ativan clarification. Latisha Quinn RN on 7/18/2022 at 4:02 PM    Spoke with pharmacist and clarified orders for Rx. Latisha Quinn RN on 7/19/2022 at 9:43 AM      [facility] Call Center    Phone Message    May a detailed message be left on voicemail: yes     Reason for Call: Medication Question or concern regarding medication   Prescription Clarification  Name of Medication: lorazepam  Prescribing Provider: JULIANE   Pharmacy: [facility] [place] [address]   What on the order needs clarification? Patient called today stating [facility] was not able to fill this prescription because it had no instructions, directions, or notes for it. Please call patient back to discuss further, thank you.      Action Taken: Message routed to:  Other: Cardiology    Travel Screening: Not Applicable

## 2022-08-08 NOTE — TELEPHONE ENCOUNTER
"Patient stated that since trying to loose weight, she has gained 8 lbs. Is constipated; goes 1x week and it \"flies out of me.\" She states she drinks \"lots of water.\" Asked patient to limit all fluid intake to less than 2 L. Advised she take an extra 20 mg Lasix today and tomorrow. Will call patient Wednesday for weight update.    Patient also stated she stopped her sildenafil on Saturday and is \"feeling slightly better.\" Does not want to restart it. Advised I will let Dr. Hill know and will follow up with patient regarding next steps. Latisha Quinn RN on 8/8/2022 at 1:43 PM    Patient states she is down 2 lbs and is feelings \"slightly better.\" Continues to be off sildenafil. Advised I have not heard back from Dr. Hill regarding follow up plan; will reach out again today. Latisha Quinn RN on 8/10/2022 at 2:00 PM  _______________________________________    Per Dr. Hill:     If she is feeling better - we should keep her off of sildenafil.     Losartan only if her BP is high   Latisha Quinn RN on 8/10/2022 at 3:39 PM    __________________________________________    Reviewed medication changes with patient; advised patient to continue to stay off sildenafil if she is feeling better. Patient requesting a new Rx for POC be faxed to [email or web address] ([facility] has switched to [facility]). New Rx for POC faxed to (f) [phone]. Latisha Quinn RN on 8/11/2022 at 10:04 AM    [facility] Call Center    Phone Message    May a detailed message be left on voicemail: yes     Reason for Call: Other: Per pt wanting Dr. oJhn/RN to call back regarding her medications - pt did not specify which one.     Action Taken: Message routed to:  [facility] Cardiology    Travel Screening: Not Applicable                                                                        "

## 2022-08-16 NOTE — TELEPHONE ENCOUNTER
Pt visited ED the week prior and was started on antibiotics for UTI. Reports nausea ongoing and minimal intake of food, reports adequate hydration. Discussed small frequent snacks that are blan an importance of eating with antibiotics to prevent nausea. Discussed to follow-up with PCP regarding UTI and keep [place] appointment with Dr. Hill. PT agreeable to this plan. Updated MAR to reflect discontinuation of sildenafil and losartan per previous encounters.     Bronwyn Espinosa RN on 8/16/2022 at 2:20 PM

## 2022-08-16 NOTE — TELEPHONE ENCOUNTER
Asked patient to reach out to PCP for follow up regarding side effects. Patient verbalized understanding and will call right after our phone call. Latisha Quinn RN on 8/16/2022 at 2:22 PM    [facility] Call Center    Phone Message    May a detailed message be left on voicemail: yes     Reason for Call: Medication Question or concern regarding medication   Prescription Clarification  Name of Medication: Trimethoprim/sulfamethoxazol 800/160  Prescribing Provider: ER Provider    Pharmacy:    What on the order needs clarification? The pt called because she was in the Emergency room at [facility] on Friday 8/12 for a bladder infection and she was put on Trimethoprim/sulfamethoxazol 800/160    The patient says that this medication is making her very nauseated and she feels weak. She is unable to eat but she is able to drink fluids.  She is taking this medication for 5 days and she has 2 more pills to take today and one tomorrow     Please call pt to discuss          Action Taken: Other: Cardiology    Travel Screening: Not Applicable

## 2022-08-17 NOTE — TELEPHONE ENCOUNTER
Called and spoke with the daughter regarding recent events. Creatinine resulted from 8/15 by nephrologist was significantly high. Nephrologist called patient and urged to visit the hospital. Pt did comply with going to the ER. Discussed with daughter to call in the interim for any other concerns.   Bronwyn Espinosa RN on 8/17/2022 at 2:20 PM      ----------------------------------------------------------  [facility] Call Center    Phone Message    May a detailed message be left on voicemail: yes     Reason for Call: Other: Lula is requesting a call back from Dr Hill to update him on her mother's condition. Her nephrologist had some labs done and the results were not good. They showed that she is in kidney failure, so an ambulance was called to pick Patty up from her house. She is being transferred to an [facility]. Please give Lula a call back at [phone] to discuss further. Thank you!    Action Taken: Other: Cardiology    Travel Screening: Not Applicable                                                                       Bilateral dressing change with compression by the cardiac PA and continue to monitor groin and vital signs, maintain bedrest until 11pm tonight.

## (undated) DEVICE — TUBING PRESSURE 30"

## (undated) DEVICE — Device

## (undated) DEVICE — INTRODUCER SHEATH 4FRX40CM MICROPUNC PED G47946

## (undated) DEVICE — PACK HEART RIGHT CUSTOM SAN32RHF18

## (undated) DEVICE — INTRO SHEATH 7FRX10CM PINNACLE RSS702

## (undated) RX ORDER — LIDOCAINE HYDROCHLORIDE 10 MG/ML
INJECTION, SOLUTION EPIDURAL; INFILTRATION; INTRACAUDAL; PERINEURAL
Status: DISPENSED
Start: 2018-08-23

## (undated) RX ORDER — LIDOCAINE 40 MG/G
CREAM TOPICAL
Status: DISPENSED
Start: 2020-08-19

## (undated) RX ORDER — LIDOCAINE HYDROCHLORIDE 10 MG/ML
INJECTION, SOLUTION EPIDURAL; INFILTRATION; INTRACAUDAL; PERINEURAL
Status: DISPENSED
Start: 2020-08-19

## (undated) RX ORDER — LIDOCAINE 40 MG/G
CREAM TOPICAL
Status: DISPENSED
Start: 2018-08-23